# Patient Record
Sex: MALE | Race: WHITE | NOT HISPANIC OR LATINO | Employment: FULL TIME | URBAN - METROPOLITAN AREA
[De-identification: names, ages, dates, MRNs, and addresses within clinical notes are randomized per-mention and may not be internally consistent; named-entity substitution may affect disease eponyms.]

---

## 2023-08-25 DIAGNOSIS — I10 BENIGN ESSENTIAL HTN: Primary | ICD-10-CM

## 2023-08-28 PROBLEM — H40.003 GLAUCOMA SUSPECT OF BOTH EYES: Status: ACTIVE | Noted: 2023-08-28

## 2023-08-28 PROBLEM — M17.11 RIGHT KNEE DJD: Status: ACTIVE | Noted: 2023-08-28

## 2023-08-28 PROBLEM — L30.9 DERMATITIS, UNSPECIFIED: Status: ACTIVE | Noted: 2023-02-09

## 2023-08-28 PROBLEM — H52.209 ASTIGMATISM: Status: ACTIVE | Noted: 2023-08-28

## 2023-08-28 PROBLEM — I10 BENIGN ESSENTIAL HTN: Status: ACTIVE | Noted: 2023-08-28

## 2023-08-28 PROBLEM — N52.9 INABILITY TO ATTAIN ERECTION: Status: ACTIVE | Noted: 2023-08-28

## 2023-08-28 PROBLEM — E78.00 ELEVATED LDL CHOLESTEROL LEVEL: Status: ACTIVE | Noted: 2023-08-28

## 2023-08-28 PROBLEM — C43.72 MALIGNANT MELANOMA OF LEFT LOWER LEG (MULTI): Status: ACTIVE | Noted: 2023-08-28

## 2023-08-28 PROBLEM — M25.562 ACUTE PAIN OF LEFT KNEE: Status: ACTIVE | Noted: 2023-08-28

## 2023-08-28 RX ORDER — CLOBETASOL PROPIONATE 0.5 MG/G
CREAM TOPICAL
COMMUNITY
Start: 2022-04-29 | End: 2024-02-23 | Stop reason: HOSPADM

## 2023-08-28 RX ORDER — IRBESARTAN 150 MG/1
150 TABLET ORAL DAILY
Qty: 90 TABLET | Refills: 3 | Status: SHIPPED | OUTPATIENT
Start: 2023-08-28 | End: 2023-10-31 | Stop reason: ALTCHOICE

## 2023-08-28 RX ORDER — SILDENAFIL CITRATE 20 MG/1
20 TABLET ORAL
COMMUNITY
Start: 2017-06-13 | End: 2023-10-23 | Stop reason: ALTCHOICE

## 2023-08-28 RX ORDER — HYDROXYUREA 500 MG/1
500 CAPSULE ORAL
COMMUNITY
Start: 2022-04-22 | End: 2023-10-23 | Stop reason: ALTCHOICE

## 2023-08-28 RX ORDER — IRBESARTAN 150 MG/1
150 TABLET ORAL DAILY
COMMUNITY
Start: 2019-05-12 | End: 2023-11-27 | Stop reason: SDUPTHER

## 2023-08-28 RX ORDER — SIMVASTATIN 20 MG/1
20 TABLET, FILM COATED ORAL EVERY OTHER DAY
COMMUNITY
Start: 2016-05-06 | End: 2023-11-03 | Stop reason: SDUPTHER

## 2023-08-28 RX ORDER — CHOLECALCIFEROL (VITAMIN D3) 50 MCG
50 TABLET ORAL DAILY
COMMUNITY
Start: 2021-06-01 | End: 2024-02-29 | Stop reason: DRUGHIGH

## 2023-09-07 ENCOUNTER — HOSPITAL ENCOUNTER (OUTPATIENT)
Dept: DATA CONVERSION | Facility: HOSPITAL | Age: 63
End: 2023-09-07
Attending: OPHTHALMOLOGY | Admitting: OPHTHALMOLOGY
Payer: COMMERCIAL

## 2023-09-07 DIAGNOSIS — H25.811 COMBINED FORMS OF AGE-RELATED CATARACT, RIGHT EYE: ICD-10-CM

## 2023-09-29 VITALS
HEART RATE: 70 BPM | SYSTOLIC BLOOD PRESSURE: 156 MMHG | RESPIRATION RATE: 16 BRPM | DIASTOLIC BLOOD PRESSURE: 89 MMHG | TEMPERATURE: 98.1 F

## 2023-09-30 NOTE — H&P
History of Present Illness:   History Present Illness:  Reason for surgery: cataract right eye   HPI:    Patient w/hx of cataract right eye presenting for cataract extraction with intraocular lens implantation of the right eye. Feels well, no health changes since prior  physician exam.     Allergies:        Allergies:  ·  No Known Allergies :     Home Medication Review:   Home Medications Reviewed: yes     Impression/Procedure:   ·  Impression and Planned Procedure: hx of cataract right eye presenting for cataract extraction with intraocular lens implantation of the right eye       ERAS (Enhanced Recovery After Surgery):  ·  ERAS Patient: no       Vital Signs:  Temperature C: 36.7 degrees C   Temperature F: 98 degrees F   Heart Rate: 70 beats per minute   Respiratory Rate: 16 breath per minute   Blood Pressure Systolic: 156 mm/Hg   Blood Pressure Diastolic: 89 mm/Hg     Physical Exam by System:    Constitutional: Well developed, awake/alert, no distress,  alert and cooperative   Eyes: Clear sclera   ENMT: mucous membranes moist, no apparent injury,  further exam per anesthesia   Respiratory/Thorax: Unlabored respirations, symmetric  chest expansion without stridor, auscultation per anesthesia   Cardiovascular: Auscultation per anesthesia   Gastrointestinal: abdomen nondistended   Extremities: no cyanosis, contusions or wounds   Neurological: alert and oriented x3   Psychological: Appropriate mood and behavior   Skin: Warm and dry     Consent:   COVID-19 Consent:  ·  COVID-19 Risk Consent Surgeon has reviewed key risks related to the risk of rody COVID-19 and if they contract COVID-19 what the risks are.     Attestation:   Note Completion:  I am a:  Resident/Fellow   Attending Attestation I saw and evaluated the patient.  I personally obtained the key and critical portions of the history and physical exam or was physically present for key and  critical portions performed by the resident/fellow. I reviewed  the resident/fellow?s documentation and discussed the patient with the resident/fellow.  I agree with the resident/fellow?s medical decision making as documented in the note.     I personally evaluated the patient on 07-Sep-2023         Electronic Signatures:  Delfino Yip (Resident))  (Signed 07-Sep-2023 12:07)   Authored: History of Present Illness, Allergies, Home  Medication Review, Impression/Procedure, ERAS, Physical Exam, Consent, Note Completion  Marivel Clarke)  (Signed 07-Sep-2023 13:28)   Authored: Note Completion   Co-Signer: History of Present Illness, Allergies, Home Medication Review, Impression/Procedure, ERAS, Physical Exam, Consent, Note Completion      Last Updated: 07-Sep-2023 13:28 by Marivel Clarke)

## 2023-10-01 NOTE — OP NOTE
Post Operative Note:     PreOp Diagnosis: Combined cataract - Right eye   Post-Procedure Diagnosis: Combined cataract - Right  eye   Procedure: Phacoemulsification of cataract with intraocular  lens implant - RIGHT Eye   Surgeon: Marivel Clarke MD   Resident/Fellow/Other Assistant: Delfino Yip MD   Estimated Blood Loss (mL): none   Specimen: no   Complications: None   Findings: As above     Operative Report Dictated:  Dictation: not applicable - note contains Operative  Report   Note Recipients: Crescencio Bourgeois MD   Operative Report:    Patient name: Van Hayes  YOB: 1960  MRN: [id number]  Date of surgery: 09/07/2023  Preoperative diagnosis: Combined cataract of the RIGHT eye  Postoperative diagnosis: Combined cataract of the RIGHT eye  Procedure: Phacoemulsification of cataract with insertion of intraocular lens, RIGHT eye   Surgeon: Marivel Clarke MD  Resident: Delfino Yip MD  Anesthesia: MAC  Complications: None  Lens Inserted: Jose Ramon & Jose Ramon DIB00 with a power of +14.00 D, serial # [id number]. Exp: 04/22/2026.    Procedure description: After the risks, benefits, and alternatives of the planned procedure were discussed with the patient, informed consent was obtained at the preoperative evaluation. On the day of surgery, there were no updates to the consent form  and any patient questions were answered. The patient was correctly identified in the preoperative area and the RIGHT eye was marked as the operative eye. Dilating  drops were instilled into the RIGHT eye in the preoperative area. The patient was then  taken back to the operating room and placed under sedation. The patient was prepped and draped in the standard, sterile ophthalmic fashion in preparation for intraocular surgery.    A lid speculum was placed into the RIGHT palpebral fissure and the operating microscope was brought into position. A paracentesis was made in superotemporal cornea at the limbus with a  1.0mm side port blade using a cotton tipped applicator to provide  countertraction. Intracameral lidocaine was injected into the anterior chamber followed by Viscoat viscoelastic. Using 0.12 forceps to stabilize the globe, a 2.4mm keratome blade was used to create a limbal clear-corneal incision inferotemporally. A bent-needle  cystotome and Utrata forceps were used to create a continuous curvilinear capsulorrhexis. Balanced salt saline solution on a blunt-tipped cannula was used to achieve hydrodissection.    A phacoemulsification device and a [place] spatula were used to remove the nucleus using a divide-and-conquer technique. Residual cortical material was removed with the irrigation and aspiration handpiece. Provisc viscoelastic was then injected into  the eye to reform the anterior chamber and to open the capsular bag. The posterior capsule was inspected and found to be clean and intact. The intraocular lens, an  Jose Ramon & Jose Ramon DIB00 with a power of +14.00 D was injected into the capsular bag. A  lens positioner was used to center the lens and ensure good position within the bag. The remaining viscoelastic was removed using irrigation and aspiration. Balanced salt saline solution on a blunt-tipped cannula was then used to hydrate the corneal stroma  adjacent to the main wound and paracentesis site as well as to reform the anterior chamber. The wound was checked and found to be watertight with normal intraocular pressure verified using digital palpation. At the conclusion of the case, a well-centered  intraocular lens with a good red reflex was observed. Tetracaine, betadine, BSS, and prednisolone acetate drops were instilled into the RIGHT eye. The lid speculum and drapes were removed. A clear plastic shield was then taped over the eye. The patient  was taken to the recovery room in stable condition, having tolerated the procedure well. There were no complications.      Attestation:   Note  Completion:  Attending Attestation I performed the procedure without a resident   Comments/ Additional Findings    I performed the surgery with the resident as an observer        Electronic Signatures:  Marivel Clarke)  (Signed 07-Sep-2023 13:33)   Authored: Post Operative Note, Note Completion      Last Updated: 07-Sep-2023 13:33 by Marivel Clarke)

## 2023-10-18 ENCOUNTER — PREP FOR PROCEDURE (OUTPATIENT)
Dept: OPHTHALMOLOGY | Facility: CLINIC | Age: 63
End: 2023-10-18
Payer: COMMERCIAL

## 2023-10-18 DIAGNOSIS — H25.812 COMBINED FORMS OF AGE-RELATED CATARACT OF LEFT EYE: Primary | ICD-10-CM

## 2023-10-18 RX ORDER — CYCLOPENTOLATE HYDROCHLORIDE 10 MG/ML
1 SOLUTION/ DROPS OPHTHALMIC ONCE
Status: CANCELLED | OUTPATIENT
Start: 2023-10-18 | End: 2023-10-18

## 2023-10-18 RX ORDER — TETRACAINE HYDROCHLORIDE 5 MG/ML
1 SOLUTION OPHTHALMIC ONCE
Status: CANCELLED | OUTPATIENT
Start: 2023-10-18 | End: 2023-10-18

## 2023-10-29 NOTE — PROGRESS NOTES
Subjective   Patient ID: Van Hayes is a 62 y.o. male who presents for CPE    HPI   The patient reports improvement in visual acuity in the right eye since his surgery in early September.  He does however report that since the surgery he has noted more frequent floaters in the right field of vision.  He reports no other associated symptoms..  The patient is scheduled for cataract surgery on the left eye November 2    The patient also reports a history of increased fatigue recently.  He does report that his wife has commented on his snoring but he states that she has not noted any apneic episodes.    The patient reports continued chronic difficulty achieving and maintaining erections times years.  He reports no associated symptoms.  He reports no change in his ability to achieve and maintain erections with use of sildenafil 40 mg p.o. daily as needed    Patient reports a history of frequent episodes of soreness over the medial surface of the left knee times a few weeks beginning 2 months ago.  He reports that the episodes developed acutely while he was carrying for his son.  He reports that the episodes were precipitated by certain movements.  He reports no associated swelling or instability.  No other associated symptoms.    Over the past year, the patient reports continued chronic intermittent episodes of dull/aching pain in the right knee.  He reports that the episodes still can be precipitated by walking up stairs greater than downstairs.  He still reports associated instability in the knee sometimes.  He reports no other associated symptoms.  Over the past year, he reports a decrease in the frequency and intensity of the episodes.    Over the past year, the patient reports chronic intermittent episodes of generalized pruritus.  He reports that the only time that he experiences the pruritus is when the pressure of a sheet is applied to the skin.  He reports no associated rash.  No other associated symptoms.   Over the past year, he reports a decrease in the frequency and intensity of the episodes.    The patient reports continued chronic occasional episodes of soreness over the posterior surface of the neck times years.  He reports that the episodes usually are present in the morning when he wakes up and that the discomfort improves as the day goes on.  He reports no radiation of discomfort.  He reports no associated bilateral upper extremity weakness/paresthesias.  No other associated symptoms.  No other new complaints.  Review of Systems  All systems have been reviewed and are normal except as previously noted  Objective   There were no vitals taken for this visit.    Physical Exam  Head - palpation revealed no tenderness over the maxillary or frontal sinuses  Eyes - extraocular movements intact pupils equal and reactive to light; right fundus not well-visualized, left fundus revealed good retinal color, no hemorrhages or exudates  Ears - palpation of pinnas and traguses revealed no tenderness. External auditory canals not erythematous or swollen. TMs clear.   Nose - turbinates not erythematous or swollen, nasal septal deviation noted to the right  Mouth - posterior pharynx is erythematous but not swollen Tonsillar pillars appeared normal no exudates  Neck - no lymphadenopathy. Thyroid gland not enlarged. , No bruits  Lungs - clear to auscultation bilaterally  Cardiac - rate normal rhythm regular no murmurs no JVD  Abdomen - soft nondistended normal active bowel sounds. Palpation revealed no tenderness or masses  Rectal- stool guaiac negative. Hemorrhoids noted. Prostate gland mildly enlarged, no masses  Pulses 2+ bilaterally   Extremities-no peripheral edema  Musculoskeletal  Cervical spine-no erythema or swelling.  Full range of motion with mild discomfort noted on rotation bilaterally.  Full range of motion with no pain or discomfort noted in all other directions of motion.  Palpation revealed no tenderness or  increase in warmth  Lumbar spine-no erythema or swelling. Full range of motion with mild discomfort noted on rotation and bending to the left.  Full range of motion with no pain noted in all other directions of motion.. Full range of motion with no pain noted in all other directions of motion. Palpation revealed no tenderness or increase in warmth  Right knee -no erythema or swelling noted.. Full range of motion in all directions of motion with no pain. Palpation revealed crepitus but no tenderness or increase in warmth.  Negative Lachmans test, negative Willy test-;, negative patellar apprehension test. There is no pain or laxity of the collateral ligaments.  Skin-moderate scaling noted over the lateral, medial, and plantar surfaces of both feet. No erythema or eruptions noted. Palpation revealed no tenderness or increase in warmth.  Yellowish-greenish discoloration noted in all toenails.    Neurologic  Mental status-alert and oriented x3   Cranial nerves-2 through 12 grossly intact, no visual field abnormalities  Motor-no pronator drift noted, strength-5/5 in all muscle groups tested, , no tremor noted.  No bradykinesia noted.  No rigidity noted.  Negative pull test  Sensory-Light touch sensation fully intact  Pinprick sensation fully intact  Vibratory sensation diminished in the first toes bilaterally equally  Cerebellar-no truncal ataxia, good coordination finger-nose testing,, good coordination heel-to-shin testing, normal rapid alternating movements  Romberg negative, no abnormality in tandem gait  Reflexes-1+/4 bilaterally  Assessment/Plan           Assessment     Noncardiac chest pain  Hypertension -second blood pressure at goal  Chronic intermittent presence of floaters in both fields of vision-probably secondary to posterior vitreous detachment.  Cataract right eye status post removal September 7, 2023 ;cataract left eye  Allergic rhinitis  Gastroesophageal reflux  Colonic polyp  Chronic frequent  urination, chronic mildly weak stream-, urinary hesitancy, slow urine stream-? Secondary to BPH  Erectile dysfunction  Impaired fasting glucose  Hypertriglyceridemia  Vitamin B12 deficiency  Vitamin D deficiency-mild  Chronic intermittent episodes of dull/aching pain in right knee- maybe secondary to meniscus tear, and/or osteoarthritis  Osteoarthritis of the right knee  Chronic occasional episodes of soreness over the posterior surface of the neck-may be secondary to osteoarthritis and degenerative disc disease of the cervical spine at C5-C7  Mild osteoarthritis and degenerative disc disease of the cervical spine at C5-C7  Chronic intermittent episodes of aching tightness located just above the superior end of the midportion of the left buttock-may be secondary to lumbosacral strain, sacroiliac joint dysfunction, osteoarthritis and degenerative disc disease of the lumbosacral spine  Recent history of generalized fatigue-unsure of etiology.  May be secondary to obstructive sleep apnea?  Chronic intermittent episodes of generalized pruritus-unsure of etiology.  Melanoma left posterior lower leg status post excision September 24, 2021  Chronic presence of scaling noted over the lateral, medial and plantar surfaces of both feet-likely secondary tinea pedis  Onychomycosis of multiple toenails    Plan  Obtain CBC differential, CMP, fasting lipid profile, TSH, vitamin D level, vitamin B12 level, cardiac CRP is soon as possible  Obtain EKG and urinalysis today.  Increase sildenafil dosage to 60 mg p.o. daily as needed  I again told the patient to take Relafen 500 mg p.o. twice daily as needed.  If lab work unremarkable, I will consider obtaining a home sleep study.  The patient will continue all of his other current medications and supplements.  He will return for a complete physical examination in 1 year

## 2023-10-31 ENCOUNTER — OFFICE VISIT (OUTPATIENT)
Dept: PRIMARY CARE | Facility: CLINIC | Age: 63
End: 2023-10-31
Payer: COMMERCIAL

## 2023-10-31 VITALS
WEIGHT: 243.4 LBS | BODY MASS INDEX: 33.71 KG/M2 | SYSTOLIC BLOOD PRESSURE: 120 MMHG | HEART RATE: 82 BPM | DIASTOLIC BLOOD PRESSURE: 80 MMHG

## 2023-10-31 DIAGNOSIS — Z00.00 ROUTINE GENERAL MEDICAL EXAMINATION AT A HEALTH CARE FACILITY: ICD-10-CM

## 2023-10-31 DIAGNOSIS — E78.00 ELEVATED LDL CHOLESTEROL LEVEL: ICD-10-CM

## 2023-10-31 DIAGNOSIS — N52.9 INABILITY TO ATTAIN ERECTION: ICD-10-CM

## 2023-10-31 DIAGNOSIS — I10 BENIGN ESSENTIAL HTN: Primary | ICD-10-CM

## 2023-10-31 DIAGNOSIS — R73.01 IFG (IMPAIRED FASTING GLUCOSE): ICD-10-CM

## 2023-10-31 DIAGNOSIS — C43.72 MALIGNANT MELANOMA OF LEFT LOWER LEG (MULTI): ICD-10-CM

## 2023-10-31 DIAGNOSIS — M17.11 PRIMARY OSTEOARTHRITIS OF RIGHT KNEE: ICD-10-CM

## 2023-10-31 LAB
POC APPEARANCE, URINE: CLEAR
POC BILIRUBIN, URINE: NEGATIVE
POC BLOOD, URINE: NEGATIVE
POC COLOR, URINE: YELLOW
POC GLUCOSE, URINE: NEGATIVE MG/DL
POC KETONES, URINE: NEGATIVE MG/DL
POC LEUKOCYTES, URINE: NEGATIVE
POC NITRITE,URINE: NEGATIVE
POC PH, URINE: 5 PH
POC PROTEIN, URINE: NEGATIVE MG/DL
POC SPECIFIC GRAVITY, URINE: 1.02
POC UROBILINOGEN, URINE: 0.2 EU/DL

## 2023-10-31 PROCEDURE — 81002 URINALYSIS NONAUTO W/O SCOPE: CPT | Performed by: INTERNAL MEDICINE

## 2023-10-31 PROCEDURE — G0439 PPPS, SUBSEQ VISIT: HCPCS | Performed by: INTERNAL MEDICINE

## 2023-10-31 PROCEDURE — 3074F SYST BP LT 130 MM HG: CPT | Performed by: INTERNAL MEDICINE

## 2023-10-31 PROCEDURE — 93000 ELECTROCARDIOGRAM COMPLETE: CPT | Performed by: INTERNAL MEDICINE

## 2023-10-31 PROCEDURE — 3079F DIAST BP 80-89 MM HG: CPT | Performed by: INTERNAL MEDICINE

## 2023-10-31 PROCEDURE — 1036F TOBACCO NON-USER: CPT | Performed by: INTERNAL MEDICINE

## 2023-10-31 RX ORDER — NABUMETONE 500 MG/1
500 TABLET, FILM COATED ORAL 2 TIMES DAILY
COMMUNITY
End: 2023-10-31 | Stop reason: SDUPTHER

## 2023-10-31 RX ORDER — LANOLIN ALCOHOL/MO/W.PET/CERES
1000 CREAM (GRAM) TOPICAL DAILY
COMMUNITY

## 2023-10-31 RX ORDER — NABUMETONE 500 MG/1
500 TABLET, FILM COATED ORAL 2 TIMES DAILY
Qty: 60 TABLET | Refills: 2 | Status: SHIPPED | OUTPATIENT
Start: 2023-10-31 | End: 2024-02-23 | Stop reason: HOSPADM

## 2023-11-01 ENCOUNTER — LAB (OUTPATIENT)
Dept: LAB | Facility: LAB | Age: 63
End: 2023-11-01
Payer: COMMERCIAL

## 2023-11-01 ENCOUNTER — ANESTHESIA EVENT (OUTPATIENT)
Dept: OPERATING ROOM | Facility: CLINIC | Age: 63
End: 2023-11-01
Payer: COMMERCIAL

## 2023-11-01 DIAGNOSIS — E78.00 ELEVATED LDL CHOLESTEROL LEVEL: ICD-10-CM

## 2023-11-01 DIAGNOSIS — R73.01 IFG (IMPAIRED FASTING GLUCOSE): ICD-10-CM

## 2023-11-01 DIAGNOSIS — I10 BENIGN ESSENTIAL HTN: ICD-10-CM

## 2023-11-01 DIAGNOSIS — C43.72 MALIGNANT MELANOMA OF LEFT LOWER LEG (MULTI): ICD-10-CM

## 2023-11-01 DIAGNOSIS — N52.9 INABILITY TO ATTAIN ERECTION: ICD-10-CM

## 2023-11-01 LAB
25(OH)D3 SERPL-MCNC: 54 NG/ML (ref 30–100)
ALBUMIN SERPL BCP-MCNC: 4.6 G/DL (ref 3.4–5)
ALP SERPL-CCNC: 77 U/L (ref 33–136)
ALT SERPL W P-5'-P-CCNC: 26 U/L (ref 10–52)
ANION GAP SERPL CALC-SCNC: 10 MMOL/L (ref 10–20)
AST SERPL W P-5'-P-CCNC: 23 U/L (ref 9–39)
BASOPHILS # BLD AUTO: 0.07 X10*3/UL (ref 0–0.1)
BASOPHILS NFR BLD AUTO: 1.2 %
BILIRUB SERPL-MCNC: 0.8 MG/DL (ref 0–1.2)
BUN SERPL-MCNC: 14 MG/DL (ref 6–23)
CALCIUM SERPL-MCNC: 9.4 MG/DL (ref 8.6–10.3)
CHLORIDE SERPL-SCNC: 103 MMOL/L (ref 98–107)
CHOLEST SERPL-MCNC: 193 MG/DL (ref 0–199)
CHOLESTEROL/HDL RATIO: 4.2
CO2 SERPL-SCNC: 29 MMOL/L (ref 21–32)
CREAT SERPL-MCNC: 0.94 MG/DL (ref 0.5–1.3)
EOSINOPHIL # BLD AUTO: 0.3 X10*3/UL (ref 0–0.7)
EOSINOPHIL NFR BLD AUTO: 5.2 %
ERYTHROCYTE [DISTWIDTH] IN BLOOD BY AUTOMATED COUNT: 12.7 % (ref 11.5–14.5)
GFR SERPL CREATININE-BSD FRML MDRD: >90 ML/MIN/1.73M*2
GLUCOSE SERPL-MCNC: 93 MG/DL (ref 74–99)
HCT VFR BLD AUTO: 48.3 % (ref 41–52)
HDLC SERPL-MCNC: 45.7 MG/DL
HGB BLD-MCNC: 16.3 G/DL (ref 13.5–17.5)
IMM GRANULOCYTES # BLD AUTO: 0.05 X10*3/UL (ref 0–0.7)
IMM GRANULOCYTES NFR BLD AUTO: 0.9 % (ref 0–0.9)
LDLC SERPL CALC-MCNC: 119 MG/DL
LYMPHOCYTES # BLD AUTO: 1.23 X10*3/UL (ref 1.2–4.8)
LYMPHOCYTES NFR BLD AUTO: 21.2 %
MCH RBC QN AUTO: 30.6 PG (ref 26–34)
MCHC RBC AUTO-ENTMCNC: 33.7 G/DL (ref 32–36)
MCV RBC AUTO: 91 FL (ref 80–100)
MONOCYTES # BLD AUTO: 0.42 X10*3/UL (ref 0.1–1)
MONOCYTES NFR BLD AUTO: 7.3 %
NEUTROPHILS # BLD AUTO: 3.72 X10*3/UL (ref 1.2–7.7)
NEUTROPHILS NFR BLD AUTO: 64.2 %
NON HDL CHOLESTEROL: 147 MG/DL (ref 0–149)
NRBC BLD-RTO: 0.5 /100 WBCS (ref 0–0)
PLATELET # BLD AUTO: 177 X10*3/UL (ref 150–450)
POTASSIUM SERPL-SCNC: 4.5 MMOL/L (ref 3.5–5.3)
PROT SERPL-MCNC: 7.2 G/DL (ref 6.4–8.2)
PSA SERPL-MCNC: 1.2 NG/ML
RBC # BLD AUTO: 5.32 X10*6/UL (ref 4.5–5.9)
SODIUM SERPL-SCNC: 137 MMOL/L (ref 136–145)
TRIGL SERPL-MCNC: 143 MG/DL (ref 0–149)
TSH SERPL-ACNC: 1.33 MIU/L (ref 0.44–3.98)
VIT B12 SERPL-MCNC: 664 PG/ML (ref 211–911)
VLDL: 29 MG/DL (ref 0–40)
WBC # BLD AUTO: 5.8 X10*3/UL (ref 4.4–11.3)

## 2023-11-01 PROCEDURE — 80053 COMPREHEN METABOLIC PANEL: CPT

## 2023-11-01 PROCEDURE — 84402 ASSAY OF FREE TESTOSTERONE: CPT

## 2023-11-01 PROCEDURE — 84443 ASSAY THYROID STIM HORMONE: CPT

## 2023-11-01 PROCEDURE — 82306 VITAMIN D 25 HYDROXY: CPT

## 2023-11-01 PROCEDURE — 80061 LIPID PANEL: CPT

## 2023-11-01 PROCEDURE — 86141 C-REACTIVE PROTEIN HS: CPT

## 2023-11-01 PROCEDURE — 82607 VITAMIN B-12: CPT

## 2023-11-01 PROCEDURE — 85025 COMPLETE CBC W/AUTO DIFF WBC: CPT

## 2023-11-01 PROCEDURE — 36415 COLL VENOUS BLD VENIPUNCTURE: CPT

## 2023-11-01 PROCEDURE — 83036 HEMOGLOBIN GLYCOSYLATED A1C: CPT

## 2023-11-01 PROCEDURE — 84153 ASSAY OF PSA TOTAL: CPT

## 2023-11-02 ENCOUNTER — HOSPITAL ENCOUNTER (OUTPATIENT)
Facility: CLINIC | Age: 63
Setting detail: OUTPATIENT SURGERY
Discharge: HOME | End: 2023-11-02
Attending: OPHTHALMOLOGY | Admitting: OPHTHALMOLOGY
Payer: COMMERCIAL

## 2023-11-02 ENCOUNTER — ANESTHESIA (OUTPATIENT)
Dept: OPERATING ROOM | Facility: CLINIC | Age: 63
End: 2023-11-02
Payer: COMMERCIAL

## 2023-11-02 VITALS
TEMPERATURE: 98.1 F | DIASTOLIC BLOOD PRESSURE: 67 MMHG | HEIGHT: 72 IN | WEIGHT: 240.3 LBS | BODY MASS INDEX: 32.55 KG/M2 | RESPIRATION RATE: 16 BRPM | HEART RATE: 97 BPM | SYSTOLIC BLOOD PRESSURE: 130 MMHG | OXYGEN SATURATION: 97 %

## 2023-11-02 DIAGNOSIS — H25.812 COMBINED FORMS OF AGE-RELATED CATARACT OF LEFT EYE: Primary | ICD-10-CM

## 2023-11-02 DIAGNOSIS — H25.812 COMBINED FORM OF AGE-RELATED CATARACT, LEFT EYE: Primary | ICD-10-CM

## 2023-11-02 PROBLEM — R21 RASH AND OTHER NONSPECIFIC SKIN ERUPTION: Status: ACTIVE | Noted: 2023-02-09

## 2023-11-02 PROBLEM — Z86.010 PERSONAL HISTORY OF COLONIC POLYPS: Status: ACTIVE | Noted: 2018-09-06

## 2023-11-02 PROBLEM — Z86.0100 PERSONAL HISTORY OF COLONIC POLYPS: Status: ACTIVE | Noted: 2018-09-06

## 2023-11-02 PROBLEM — H25.811 COMBINED FORM OF AGE-RELATED CATARACT, RIGHT EYE: Status: ACTIVE | Noted: 2023-11-02

## 2023-11-02 PROBLEM — B35.3 TINEA PEDIS: Status: ACTIVE | Noted: 2023-02-09

## 2023-11-02 PROBLEM — L81.4 OTHER MELANIN HYPERPIGMENTATION: Status: ACTIVE | Noted: 2023-02-09

## 2023-11-02 PROBLEM — H52.4 MYOPIA WITH PRESBYOPIA: Status: ACTIVE | Noted: 2023-11-02

## 2023-11-02 PROBLEM — L27.0 GENERALIZED SKIN ERUPTION DUE TO DRUGS AND MEDICAMENTS TAKEN INTERNALLY: Status: ACTIVE | Noted: 2023-02-09

## 2023-11-02 PROBLEM — H35.373 EPIRETINAL MEMBRANE (ERM) OF BOTH EYES: Status: ACTIVE | Noted: 2023-11-02

## 2023-11-02 PROBLEM — M25.461 KNEE EFFUSION, RIGHT: Status: ACTIVE | Noted: 2023-11-02

## 2023-11-02 PROBLEM — H02.409 PTOSIS: Status: ACTIVE | Noted: 2023-11-02

## 2023-11-02 PROBLEM — E53.8 VITAMIN B12 DEFICIENCY: Status: ACTIVE | Noted: 2023-11-02

## 2023-11-02 PROBLEM — Z96.1 PSEUDOPHAKIA OF RIGHT EYE: Status: ACTIVE | Noted: 2023-11-02

## 2023-11-02 PROBLEM — H43.813 PVD (POSTERIOR VITREOUS DETACHMENT), BOTH EYES: Status: ACTIVE | Noted: 2023-11-02

## 2023-11-02 PROBLEM — H52.10 MYOPIA WITH PRESBYOPIA: Status: ACTIVE | Noted: 2023-11-02

## 2023-11-02 PROBLEM — R06.2 WHEEZING: Status: ACTIVE | Noted: 2023-11-02

## 2023-11-02 PROBLEM — D22.72 NEVUS OF LEFT THIGH: Status: ACTIVE | Noted: 2023-11-02

## 2023-11-02 PROBLEM — D22.5 MELANOCYTIC NEVI OF TRUNK: Status: ACTIVE | Noted: 2023-02-09

## 2023-11-02 PROBLEM — D22.72 MELANOCYTIC NEVI OF LEFT LOWER LIMB, INCLUDING HIP: Status: ACTIVE | Noted: 2023-02-09

## 2023-11-02 LAB
CRP SERPL HS-MCNC: 0.5 MG/L
EST. AVERAGE GLUCOSE BLD GHB EST-MCNC: 117 MG/DL
HBA1C MFR BLD: 5.7 %

## 2023-11-02 PROCEDURE — 3600000008 HC OR TIME - EACH INCREMENTAL 1 MINUTE - PROCEDURE LEVEL THREE: Performed by: OPHTHALMOLOGY

## 2023-11-02 PROCEDURE — 7100000010 HC PHASE TWO TIME - EACH INCREMENTAL 1 MINUTE: Performed by: OPHTHALMOLOGY

## 2023-11-02 PROCEDURE — A66984 PR REMV CATARACT EXTRACAP,INSERT LENS: Performed by: ANESTHESIOLOGIST ASSISTANT

## 2023-11-02 PROCEDURE — 2500000005 HC RX 250 GENERAL PHARMACY W/O HCPCS: Performed by: OPHTHALMOLOGY

## 2023-11-02 PROCEDURE — 2500000001 HC RX 250 WO HCPCS SELF ADMINISTERED DRUGS (ALT 637 FOR MEDICARE OP): Performed by: OPHTHALMOLOGY

## 2023-11-02 PROCEDURE — 2500000004 HC RX 250 GENERAL PHARMACY W/ HCPCS (ALT 636 FOR OP/ED): Performed by: ANESTHESIOLOGIST ASSISTANT

## 2023-11-02 PROCEDURE — 2500000001 HC RX 250 WO HCPCS SELF ADMINISTERED DRUGS (ALT 637 FOR MEDICARE OP): Performed by: STUDENT IN AN ORGANIZED HEALTH CARE EDUCATION/TRAINING PROGRAM

## 2023-11-02 PROCEDURE — A4217 STERILE WATER/SALINE, 500 ML: HCPCS | Performed by: OPHTHALMOLOGY

## 2023-11-02 PROCEDURE — 2500000004 HC RX 250 GENERAL PHARMACY W/ HCPCS (ALT 636 FOR OP/ED): Performed by: OPHTHALMOLOGY

## 2023-11-02 PROCEDURE — A66984 PR REMV CATARACT EXTRACAP,INSERT LENS: Performed by: ANESTHESIOLOGY

## 2023-11-02 PROCEDURE — 66984 XCAPSL CTRC RMVL W/O ECP: CPT | Performed by: OPHTHALMOLOGY

## 2023-11-02 PROCEDURE — 2760000001 HC OR 276 NO HCPCS: Performed by: OPHTHALMOLOGY

## 2023-11-02 PROCEDURE — 3600000003 HC OR TIME - INITIAL BASE CHARGE - PROCEDURE LEVEL THREE: Performed by: OPHTHALMOLOGY

## 2023-11-02 PROCEDURE — 7100000009 HC PHASE TWO TIME - INITIAL BASE CHARGE: Performed by: OPHTHALMOLOGY

## 2023-11-02 PROCEDURE — 3700000002 HC GENERAL ANESTHESIA TIME - EACH INCREMENTAL 1 MINUTE: Performed by: OPHTHALMOLOGY

## 2023-11-02 PROCEDURE — 3700000001 HC GENERAL ANESTHESIA TIME - INITIAL BASE CHARGE: Performed by: OPHTHALMOLOGY

## 2023-11-02 DEVICE — IMPLANTABLE DEVICE: Type: IMPLANTABLE DEVICE | Site: EYE | Status: FUNCTIONAL

## 2023-11-02 RX ORDER — LABETALOL HYDROCHLORIDE 5 MG/ML
5 INJECTION, SOLUTION INTRAVENOUS ONCE AS NEEDED
Status: DISCONTINUED | OUTPATIENT
Start: 2023-11-02 | End: 2023-11-02 | Stop reason: HOSPADM

## 2023-11-02 RX ORDER — PHENYLEPHRINE HYDROCHLORIDE 100 MG/ML
1 SOLUTION/ DROPS OPHTHALMIC
Status: COMPLETED | OUTPATIENT
Start: 2023-11-02 | End: 2023-11-02

## 2023-11-02 RX ORDER — KETOROLAC TROMETHAMINE 5 MG/ML
1 SOLUTION OPHTHALMIC 4 TIMES DAILY
COMMUNITY
End: 2024-02-23 | Stop reason: HOSPADM

## 2023-11-02 RX ORDER — PREDNISOLONE ACETATE 10 MG/ML
1 SUSPENSION/ DROPS OPHTHALMIC 4 TIMES DAILY
Qty: 5 ML | Refills: 2 | Status: SHIPPED | OUTPATIENT
Start: 2023-11-02 | End: 2024-02-23 | Stop reason: HOSPADM

## 2023-11-02 RX ORDER — TROPICAMIDE 10 MG/ML
1 SOLUTION/ DROPS OPHTHALMIC
Status: COMPLETED | OUTPATIENT
Start: 2023-11-02 | End: 2023-11-02

## 2023-11-02 RX ORDER — KETOCONAZOLE 20 MG/G
1 CREAM TOPICAL 2 TIMES DAILY
COMMUNITY
End: 2024-02-23 | Stop reason: HOSPADM

## 2023-11-02 RX ORDER — FENTANYL CITRATE 50 UG/ML
INJECTION, SOLUTION INTRAMUSCULAR; INTRAVENOUS AS NEEDED
Status: DISCONTINUED | OUTPATIENT
Start: 2023-11-02 | End: 2023-11-02

## 2023-11-02 RX ORDER — LIDOCAINE HYDROCHLORIDE 10 MG/ML
0.1 INJECTION, SOLUTION EPIDURAL; INFILTRATION; INTRACAUDAL; PERINEURAL ONCE
Status: DISCONTINUED | OUTPATIENT
Start: 2023-11-02 | End: 2023-11-02 | Stop reason: HOSPADM

## 2023-11-02 RX ORDER — LIDOCAINE HYDROCHLORIDE 10 MG/ML
INJECTION, SOLUTION EPIDURAL; INFILTRATION; INTRACAUDAL; PERINEURAL AS NEEDED
Status: DISCONTINUED | OUTPATIENT
Start: 2023-11-02 | End: 2023-11-02 | Stop reason: HOSPADM

## 2023-11-02 RX ORDER — SILDENAFIL CITRATE 20 MG/1
5 TABLET ORAL DAILY PRN
COMMUNITY
End: 2024-02-23 | Stop reason: HOSPADM

## 2023-11-02 RX ORDER — ACETAMINOPHEN 325 MG/1
650 TABLET ORAL EVERY 4 HOURS PRN
Status: DISCONTINUED | OUTPATIENT
Start: 2023-11-02 | End: 2023-11-02 | Stop reason: HOSPADM

## 2023-11-02 RX ORDER — OFLOXACIN 3 MG/ML
1 SOLUTION/ DROPS OPHTHALMIC 4 TIMES DAILY
COMMUNITY
End: 2024-02-23 | Stop reason: HOSPADM

## 2023-11-02 RX ORDER — ONDANSETRON HYDROCHLORIDE 2 MG/ML
4 INJECTION, SOLUTION INTRAVENOUS ONCE AS NEEDED
Status: DISCONTINUED | OUTPATIENT
Start: 2023-11-02 | End: 2023-11-02 | Stop reason: HOSPADM

## 2023-11-02 RX ORDER — METOCLOPRAMIDE HYDROCHLORIDE 5 MG/ML
10 INJECTION INTRAMUSCULAR; INTRAVENOUS ONCE AS NEEDED
Status: DISCONTINUED | OUTPATIENT
Start: 2023-11-02 | End: 2023-11-02 | Stop reason: HOSPADM

## 2023-11-02 RX ORDER — MIDAZOLAM HYDROCHLORIDE 1 MG/ML
INJECTION, SOLUTION INTRAMUSCULAR; INTRAVENOUS AS NEEDED
Status: DISCONTINUED | OUTPATIENT
Start: 2023-11-02 | End: 2023-11-02

## 2023-11-02 RX ORDER — SODIUM CHLORIDE 0.9 % (FLUSH) 0.9 %
SYRINGE (ML) INJECTION AS NEEDED
Status: DISCONTINUED | OUTPATIENT
Start: 2023-11-02 | End: 2023-11-02

## 2023-11-02 RX ORDER — WATER 1 ML/ML
IRRIGANT IRRIGATION AS NEEDED
Status: DISCONTINUED | OUTPATIENT
Start: 2023-11-02 | End: 2023-11-02 | Stop reason: HOSPADM

## 2023-11-02 RX ORDER — ALBUTEROL SULFATE 0.83 MG/ML
2.5 SOLUTION RESPIRATORY (INHALATION) ONCE AS NEEDED
Status: DISCONTINUED | OUTPATIENT
Start: 2023-11-02 | End: 2023-11-02 | Stop reason: HOSPADM

## 2023-11-02 RX ORDER — GLUCOSAMINE/CHONDR SU A SOD 167-133 MG
500 CAPSULE ORAL EVERY OTHER DAY
COMMUNITY
End: 2023-11-03 | Stop reason: ALTCHOICE

## 2023-11-02 RX ORDER — FENTANYL CITRATE 50 UG/ML
50 INJECTION, SOLUTION INTRAMUSCULAR; INTRAVENOUS EVERY 5 MIN PRN
Status: DISCONTINUED | OUTPATIENT
Start: 2023-11-02 | End: 2023-11-02 | Stop reason: HOSPADM

## 2023-11-02 RX ORDER — BETAMETHASONE DIPROPIONATE 0.5 MG/G
1 CREAM TOPICAL
COMMUNITY
End: 2024-02-23 | Stop reason: HOSPADM

## 2023-11-02 RX ORDER — TETRACAINE HYDROCHLORIDE 5 MG/ML
1 SOLUTION OPHTHALMIC ONCE
Status: COMPLETED | OUTPATIENT
Start: 2023-11-02 | End: 2023-11-02

## 2023-11-02 RX ORDER — SODIUM CHLORIDE, SODIUM LACTATE, POTASSIUM CHLORIDE, CALCIUM CHLORIDE 600; 310; 30; 20 MG/100ML; MG/100ML; MG/100ML; MG/100ML
100 INJECTION, SOLUTION INTRAVENOUS CONTINUOUS
Status: DISCONTINUED | OUTPATIENT
Start: 2023-11-02 | End: 2023-11-02 | Stop reason: HOSPADM

## 2023-11-02 RX ORDER — PREDNISOLONE ACETATE 10 MG/ML
1 SUSPENSION/ DROPS OPHTHALMIC 4 TIMES DAILY
Status: ON HOLD | COMMUNITY
Start: 2023-08-23 | End: 2023-11-02 | Stop reason: SDUPTHER

## 2023-11-02 RX ORDER — FENTANYL CITRATE 50 UG/ML
25 INJECTION, SOLUTION INTRAMUSCULAR; INTRAVENOUS EVERY 5 MIN PRN
Status: DISCONTINUED | OUTPATIENT
Start: 2023-11-02 | End: 2023-11-02 | Stop reason: HOSPADM

## 2023-11-02 RX ORDER — TETRACAINE HYDROCHLORIDE 5 MG/ML
SOLUTION OPHTHALMIC AS NEEDED
Status: DISCONTINUED | OUTPATIENT
Start: 2023-11-02 | End: 2023-11-02 | Stop reason: HOSPADM

## 2023-11-02 RX ORDER — POVIDONE-IODINE 5 %
SOLUTION, NON-ORAL OPHTHALMIC (EYE) AS NEEDED
Status: DISCONTINUED | OUTPATIENT
Start: 2023-11-02 | End: 2023-11-02 | Stop reason: HOSPADM

## 2023-11-02 RX ORDER — CYCLOPENTOLATE HYDROCHLORIDE 10 MG/ML
1 SOLUTION/ DROPS OPHTHALMIC ONCE
Status: COMPLETED | OUTPATIENT
Start: 2023-11-02 | End: 2023-11-02

## 2023-11-02 RX ADMIN — TROPICAMIDE 1 DROP: 10 SOLUTION/ DROPS OPHTHALMIC at 12:55

## 2023-11-02 RX ADMIN — CYCLOPENTOLATE HYDROCHLORIDE 1 DROP: 10 SOLUTION/ DROPS OPHTHALMIC at 12:45

## 2023-11-02 RX ADMIN — MIDAZOLAM 2 MG: 1 INJECTION INTRAMUSCULAR; INTRAVENOUS at 13:44

## 2023-11-02 RX ADMIN — TETRACAINE HYDROCHLORIDE 1 DROP: 5 SOLUTION/ DROPS OPHTHALMIC at 12:45

## 2023-11-02 RX ADMIN — PHENYLEPHRINE HYDROCHLORIDE 1 DROP: 100 SOLUTION/ DROPS OPHTHALMIC at 12:50

## 2023-11-02 RX ADMIN — CYCLOPENTOLATE HYDROCHLORIDE 1 DROP: 10 SOLUTION/ DROPS OPHTHALMIC at 12:55

## 2023-11-02 RX ADMIN — TROPICAMIDE 1 DROP: 10 SOLUTION/ DROPS OPHTHALMIC at 12:45

## 2023-11-02 RX ADMIN — TROPICAMIDE 1 DROP: 10 SOLUTION/ DROPS OPHTHALMIC at 12:50

## 2023-11-02 RX ADMIN — PHENYLEPHRINE HYDROCHLORIDE 1 DROP: 100 SOLUTION/ DROPS OPHTHALMIC at 12:55

## 2023-11-02 RX ADMIN — PHENYLEPHRINE HYDROCHLORIDE 1 DROP: 100 SOLUTION/ DROPS OPHTHALMIC at 12:45

## 2023-11-02 RX ADMIN — Medication 5 ML: at 13:45

## 2023-11-02 RX ADMIN — CYCLOPENTOLATE HYDROCHLORIDE 1 DROP: 10 SOLUTION/ DROPS OPHTHALMIC at 12:50

## 2023-11-02 RX ADMIN — FENTANYL CITRATE 50 MCG: 50 INJECTION, SOLUTION INTRAMUSCULAR; INTRAVENOUS at 13:44

## 2023-11-02 ASSESSMENT — PAIN SCALES - GENERAL
PAINLEVEL_OUTOF10: 0 - NO PAIN

## 2023-11-02 ASSESSMENT — PAIN - FUNCTIONAL ASSESSMENT
PAIN_FUNCTIONAL_ASSESSMENT: 0-10

## 2023-11-02 ASSESSMENT — COLUMBIA-SUICIDE SEVERITY RATING SCALE - C-SSRS
1. IN THE PAST MONTH, HAVE YOU WISHED YOU WERE DEAD OR WISHED YOU COULD GO TO SLEEP AND NOT WAKE UP?: NO
6. HAVE YOU EVER DONE ANYTHING, STARTED TO DO ANYTHING, OR PREPARED TO DO ANYTHING TO END YOUR LIFE?: NO
2. HAVE YOU ACTUALLY HAD ANY THOUGHTS OF KILLING YOURSELF?: NO

## 2023-11-02 NOTE — OP NOTE
Phacoemulsification Cataract with Insertion Intraocular Lens (L) Operative Note     Date: 2023  [place] [facility] [place] [facility] [place]    Name: Van Hayes YOB: 1960, Age: 62 y.o., MRN: [id number], Sex: male    Diagnosis  Pre-op Diagnosis     * Combined forms of age-related cataract of left eye [H25.812] Post-op Diagnosis     * Combined forms of age-related cataract of left eye [H25.812]     Procedures  Phacoemulsification Cataract with Insertion Intraocular Lens  07256 - [place] XCAPSL CTRC RMVL INSJ IO LENS PROSTH W/O ECP      Surgeons      * Marivel Clarke - Primary    Resident/Fellow/Other Assistant:  Surgeon(s) and Role:    Procedure Summary  Anesthesia: Monitor Anesthesia Care  ASA: II  Anesthesia Staff: Anesthesiologist: David Cuba DO  C-AA: ADONIS Remy  Estimated Blood Loss: 0 mL  Intra-op Medications:   Medication Name Total Dose   lidocaine PF (Xylocaine) 10 mg/mL (1 %) injection 1 mL   balanced salts (BSS) intraocular solution 150 mL   sterile water irrigation solution 20 mL   povidone-iodine 5 % ophthalmic solution 1 Application   tetracaine (PF) 0.5 % ophthalmic solution 2 drop              Anesthesia Record               Intraprocedure I/O Totals       None           Specimen: No specimens collected     Staff:   Circulator: Dominga Santana RN  Scrub Person: Katt Moreno         Drains and/or Catheters: * None in log *    Tourniquet Times:         Implants:  Implants       Type Name Action Serial No.      Ophthalmology Implant EYHANCE IOL W/ TECNIS SIMPLICITY DELIVERY SYSTEM Implanted [id number]              Findings: as below    Indications: Van Hayes is an 62 y.o. male who is having surgery for Combined forms of age-related cataract of left eye [H25.812].       Procedure Details: Patient name: Van Hayes   YOB: 1960   MRN: [id number]   Date of surgery: 2023  Preoperative diagnosis: Combined cataract of the LEFT eye  Postoperative  diagnosis: Combined cataract of the LEFT eye  Procedure: Phacoemulsification of cataract with insertion of intraocular lens, LEFT eye   Surgeon: Marivel Clarke MD  Resident:  Anesthesia: MAC  Complications: None  Lens Inserted: Jose Ramon & Jose Ramon DIB00 with a power of +13.50 D. Serial #: [id number]. Exp date: 10/13/2025.    Procedure description: After the risks, benefits, and alternatives of the planned procedure were discussed with the patient, informed consent was obtained at the preoperative evaluation. On the day of surgery, there were no updates to the consent form and any patient questions were answered. The patient was correctly identified in the preoperative area and the LEFT eye was marked as the operative eye. Dilating drops were instilled into the LEFT eye in the preoperative area. The patient was then taken back to the operating room and placed under sedation. The patient was prepped and draped in the standard, sterile ophthalmic fashion in preparation for intraocular surgery.    A lid speculum was placed into the LEFT palpebral fissure and the operating microscope was brought into position. A paracentesis was made in inferotemporal cornea at the limbus with a 1.0mm side port blade using a cotton tipped applicator to provide countertraction. Intracameral lidocaine was injected into the anterior chamber followed by Viscoat viscoelastic. Using 0.12 forceps to stabilize the globe, a 2.4mm keratome blade was used to create a limbal clear-corneal incision superotemporally. A bent-needle cystotome and Utrata forceps were used to create a continuous curvilinear capsulorrhexis. Balanced salt saline solution on a blunt-tipped cannula was used to achieve hydrodissection.    A phacoemulsification device and a [place] spatula were used to remove the nucleus using a divide-and-conquer technique. Residual cortical material was removed with the irrigation and aspiration handpiece. Provisc viscoelastic was then  injected into the eye to reform the anterior chamber and to open the capsular bag. The posterior capsule was inspected and found to be clean and intact. The intraocular lens, Jose Ramon & Jose Ramon DIB00 with a power of +13.50 diopters was injected into the capsular bag. A lens positioner was used to center the lens and ensure good position within the bag. The remaining viscoelastic was removed using irrigation and aspiration. Balanced salt saline solution on a blunt-tipped cannula was then used to hydrate the corneal stroma adjacent to the main wound and paracentesis site as well as to reform the anterior chamber. The wound was checked and found to be watertight with normal intraocular pressure verified using digital palpation. At the conclusion of the case, a well-centered intraocular lens with a good red reflex was observed. Tetracaine, betadine, BSS, and prednisolone acetate drops were instilled into the LEFT eye. The lid speculum and drapes were removed. A clear plastic shield was then taped over the eye. The patient was taken to the recovery room in stable condition, having tolerated the procedure well. There were no complications.      Complications:  None; patient tolerated the procedure well.    Disposition: PACU - hemodynamically stable.  Condition: stable         Additional Details: none    Attending Attestation: I performed the procedure.    Marivel Clarke  Phone Number: [phone]

## 2023-11-02 NOTE — DISCHARGE INSTRUCTIONS
Surgeon: Marivel Clarke MD     Patient name: Van Hayes    Date of surgery: November 2, 2023        DROP INSTRUCTIONS:    Prednisolone acetate 1% (pink or white cap) - One drop 4 times a day to operative eye    Ofloxacin (tan cap) - One drop 4 times a day to operative eye    Ketorolac (gray cap) - One drop 4 times a day to operative eye    When putting different drops in around the same administration time, please wait 1-2 minutes between drops  Avoid sleeping on side of operative eye  No heavy lifting over 10-15lbs and no bending over  Do not get eye wet, no eye rubbing  Wear sunglasses or glasses during the day and the shield when sleeping at night  Please call immediately if you develop any redness, pain, decreased vision, flashes, or floaters      During office hours (8:30a-4:30p): [phone]  After office hours: [phone])  Hospital : [phone]   Pager: [phone] for Dr. Adams

## 2023-11-02 NOTE — ANESTHESIA POSTPROCEDURE EVALUATION
Patient: Van Hayes    Procedure Summary       Date: 11/02/23 Room / Location: [facility] [place] [facility] 04 / Virtual [facility] [place] [facility]    Anesthesia Start: 1339 Anesthesia Stop: 1412    Procedure: Phacoemulsification Cataract with Insertion Intraocular Lens (Left: Eye) Diagnosis:       Combined forms of age-related cataract of left eye      (Combined forms of age-related cataract of left eye [H25.812])    Surgeons: Marivel Clarke MD Responsible Provider: David Cuba DO    Anesthesia Type: MAC ASA Status: 2            Anesthesia Type: MAC    Vitals Value Taken Time   /67 11/02/23 1420   Temp 36.7 °C (98.1 °F) 11/02/23 1420   Pulse 97 11/02/23 1420   Resp 16 11/02/23 1420   SpO2 97 % 11/02/23 1420       Anesthesia Post Evaluation    Patient location during evaluation: PACU  Level of consciousness: awake and alert  Pain management: satisfactory to patient  Multimodal analgesia pain management approach  Airway patency: patent  Cardiovascular status: acceptable  Respiratory status: acceptable  Hydration status: acceptable  Comments: No PONV    There were no known notable events for this encounter.

## 2023-11-02 NOTE — ANESTHESIA PREPROCEDURE EVALUATION
Patient: Van Hayes    Procedure Information       Date/Time: 11/02/23 1330    Procedure: Phacoemulsification Cataract with Insertion Intraocular Lens (Left: Eye)    Location: [facility] [place] [facility] OR 04 / [facility] [place] [facility]    Surgeons: Marivel Clarke MD            Relevant Problems   Anesthesia (within normal limits)      Cardiovascular  > 4 mets   (+) Benign essential HTN      Musculoskeletal   (+) Right knee DJD       Clinical information reviewed:   Tobacco  Allergies  Meds   Med Hx  Surg Hx   Fam Hx  Soc Hx        NPO Detail:  NPO/Void Status  Date of Last Liquid: 11/01/23  Time of Last Liquid: 2200  Date of Last Solid: 11/01/23  Time of Last Solid: 2200  Last Intake Type: Clear fluids; Food         Physical Exam    Airway  Mallampati: II  TM distance: >3 FB  Neck ROM: full     Cardiovascular    Dental - normal exam     Pulmonary    Abdominal        Anesthesia Plan    ASA 2     MAC     intravenous induction   Anesthetic plan and risks discussed with patient.    Plan discussed with CAA.

## 2023-11-02 NOTE — H&P
History Of Present Illness  Van Hayes is a 62 y.o. male presenting with a history of visually-significant cataract in the left eye here for cataract extraction and intraocular lens insertion of the left eye .     Past Medical History  Past Medical History:   Diagnosis Date    Hyperlipidemia     Hypertension     Melanoma (CMS/HCC)     Personal history of other endocrine, nutritional and metabolic disease 06/01/2021    History of familial combined hyperlipidemia       Surgical History  Past Surgical History:   Procedure Laterality Date    COLONOSCOPY      OTHER SURGICAL HISTORY      melanoma resected from left lower leg        Social History  He reports that he has never smoked. He has never used smokeless tobacco. He reports current alcohol use. He reports that he does not use drugs.    Family History  Family History   Problem Relation Name Age of Onset    Heart attack Father          Allergies  Patient has no known allergies.    Review of Systems   Constitutional: Negative.    HENT: Negative.     Eyes: Negative.    Respiratory: Negative.     Cardiovascular: Negative.    Gastrointestinal: Negative.    Endocrine: Negative.  .    Neurological: Negative.    Psychiatric/Behavioral: Negative.   Physical Exam   General: Alert and Oriented x3  Head and neck: atraumatic and normacephalic  Lungs: The chest wall is symmetric and without deformity. No signs of respiratory distress. Lung sounds are clear in all lobes bilaterally.   Heart: Regular rate and rhythm.   Abdomen: Soft and non-tender   Last Recorded Vitals  Blood pressure 133/82, pulse 68, temperature 36.1 °C (97 °F), temperature source Temporal, resp. rate 16, height 1.829 m (6'), weight 109 kg (240 lb 4.8 oz), SpO2 98 %.    Relevant Results           Assessment/Plan   Principal Problem:    Combined forms of age-related cataract of left eye  history of visually-significant cataract in the left eye here for cataract extraction and intraocular lens insertion of  the left eye              Kendra Byrd MD

## 2023-11-03 ENCOUNTER — DOCUMENTATION (OUTPATIENT)
Dept: PRIMARY CARE | Facility: CLINIC | Age: 63
End: 2023-11-03
Payer: COMMERCIAL

## 2023-11-03 ENCOUNTER — OFFICE VISIT (OUTPATIENT)
Dept: OPHTHALMOLOGY | Facility: CLINIC | Age: 63
End: 2023-11-03
Payer: COMMERCIAL

## 2023-11-03 DIAGNOSIS — H35.373 EPIRETINAL MEMBRANE (ERM) OF BOTH EYES: ICD-10-CM

## 2023-11-03 DIAGNOSIS — E78.00 ELEVATED LDL CHOLESTEROL LEVEL: Primary | ICD-10-CM

## 2023-11-03 DIAGNOSIS — H02.403 PTOSIS OF BOTH EYELIDS: ICD-10-CM

## 2023-11-03 DIAGNOSIS — Z96.1 PSEUDOPHAKIA: Primary | ICD-10-CM

## 2023-11-03 DIAGNOSIS — H40.003 GLAUCOMA SUSPECT OF BOTH EYES: ICD-10-CM

## 2023-11-03 DIAGNOSIS — H43.813 PVD (POSTERIOR VITREOUS DETACHMENT), BOTH EYES: ICD-10-CM

## 2023-11-03 DIAGNOSIS — H52.4 PRESBYOPIA: ICD-10-CM

## 2023-11-03 PROCEDURE — 99024 POSTOP FOLLOW-UP VISIT: CPT | Performed by: OPHTHALMOLOGY

## 2023-11-03 RX ORDER — SIMVASTATIN 20 MG/1
20 TABLET, FILM COATED ORAL NIGHTLY
Qty: 90 TABLET | Refills: 2 | Status: SHIPPED | OUTPATIENT
Start: 2023-11-03 | End: 2024-03-25 | Stop reason: SDUPTHER

## 2023-11-03 ASSESSMENT — CUP TO DISC RATIO
OS_RATIO: 0.65
OD_RATIO: 0.65

## 2023-11-03 ASSESSMENT — TONOMETRY
OS_IOP_MMHG: 17
IOP_METHOD: GOLDMANN APPLANATION

## 2023-11-03 ASSESSMENT — EXTERNAL EXAM - LEFT EYE: OS_EXAM: NORMAL

## 2023-11-03 ASSESSMENT — VISUAL ACUITY
METHOD: SNELLEN - LINEAR
OS_SC: 20/30

## 2023-11-03 ASSESSMENT — EXTERNAL EXAM - RIGHT EYE: OD_EXAM: NORMAL

## 2023-11-03 ASSESSMENT — ENCOUNTER SYMPTOMS: EYES NEGATIVE: 1

## 2023-11-03 NOTE — PATIENT INSTRUCTIONS
Surgeon: Marivel Clarke MD      Patient name: Van Hayes    Date of visit: November 3, 2023      left eye    Prednisolone acetate (pink or white cap) - 4 times a day    Ofloxacin (tan cap) - 4 times a day    Ketorolac (gray cap) - 4 times a day - finish bottle      Right eye    Prednisolone acetate (pink or white cap) - 4 times a day      No heavy lifting over 10-15 lbs, no bending over, do not get eye wet, no eye rubbing. Wear eye shield at bedtime and sunglasses/glasses during the day. Please call immediately if you develop any redness, pain, decreased vision, flashes, floaters.       Surgical Scheduler (during office hours): Dara: [phone]  Office phone (after hours): [phone] : [phone]                     Pager: [phone] for Dr. Adams

## 2023-11-03 NOTE — PROGRESS NOTES
Labs reviewed.  I told the patient to change his simvastatin dosage to daily dosing.  He will try to get more exercise and lose weight.  He will have a fasting lipid profile, BMP, hemoglobin A1c drawn in mid February 2024

## 2023-11-03 NOTE — PROGRESS NOTES
Pseudophakia of left eyeZ96.1 - 11/2/23  -Doing well. Good vision. IOP good.  Prednisolone acetate 1% - 4 times a day  Ofloxacin - 4 times a day  Ketorolac - 4 times a day - finish bottle (backorder)  -Was noticing floaters OD more prior to CEIOL OS, however, now that cataract has been removed OS, he is not noticing the floaters as much. No flashes.   No heavy lifting over 10-15 lbs, no bending over, do not get eye wet, no eye rubbing. Wear eye shield at bedtime and sunglasses/glasses during the day. Advised to call if any redness, pain, decreased vision, flashes, floaters. F/u 1 week - refract both eyes (autorefract first), check uncorrected near vision each eye.     Pseudophakia of right eyeZ96.1 - 9/7/23  -Doing well. Good vision. IOP good. Off all gtts. Residual inflammation.   -Restart Prednisolone acetate 1% - 4 times a day    Glaucoma suspect of both eyesH40.003  -(+)FH glaucoma - mother  -Increased cup to disc ratio  -Pachymetry (5/17/23) - 566/534  -OCT RNFL (5/17/23) - SS: 7/10 OD and 6/10 OS. OD: Thin S, bord T. OS: Thin S. 71/74. However, poor image capture superiorly OU.   -History of HVF testing in the past, but no history of treatment for glaucoma  -Will defer treatment for now and monitor. Will plan to repeat pachymetry OU (asymmetric values today) and OCT RNFL following healing from CEIOL OU. Will monitor IOP carefully perioperatively.     Epiretinal membrane (ERM) of both eyesH35.373  -OCT macula (5/17/23) - SS: 7/10 OU. OD: Normal thickness and contour. Mild ERM with mild surface wrinkling nasal to fovea. Intact IS-OS. No edema. OS: ERM with distortion of foveal contour with lamellar vs pseudohole. 325/348.  -Guarded visual prognosis with cataract surgery. Will consider referral to retina service in the future following CEIOL if no significant improvement in vision. Offered visit with retina specialist prior to CEIOL, however, patient agrees will see retina service as needed following CEIOL  OU.     PVD (posterior vitreous detachment), both eyesH43.813  -No retinal tear or detachment seen on exam. Retinal detachment symptoms discussed.     JzpocfW78.409  -May be related to long history of RGP wear. Can consider referral to oculoplastic surgeon in the future.     QcednaS70.10  NuqsifmjmjhZ45.209  MrhcemoaygS37.4  -RGP for 40 years   -F/u 1 week - refract both eyes (autorefract first), check uncorrected near vision each eye.   -At final postop visit, would like Rx for progressives.       No history of refractive surgery.   No FH of AMD

## 2023-11-05 LAB
TESTOSTERONE FREE (CHAN): 83.1 PG/ML (ref 35–155)
TESTOSTERONE,TOTAL,LC-MS/MS: 472 NG/DL (ref 250–1100)

## 2023-11-05 ASSESSMENT — EXTERNAL EXAM - LEFT EYE: OS_EXAM: NORMAL

## 2023-11-05 ASSESSMENT — EXTERNAL EXAM - RIGHT EYE: OD_EXAM: NORMAL

## 2023-11-06 NOTE — PATIENT INSTRUCTIONS
Surgeon: Marivel Clarke MD                   [phone]      Patient name: Van Hayes    Date of visit: 11/8/23      left eye    Prednisolone acetate (pink or white cap) - 4 times a day for 1 week, 3 times a day for 1 week, 2 times a day for 1 week, once a day for 1 week, then stop    Ofloxacin (tan cap) - 4 times a day for 1 more week, then stop    Ketorolac (gray cap) - 4 times a day for 1 more week, then stop (just finish bottle)    Right eye    Prednisolone acetate (pink or white cap) - 4 times a day for 1 week, then stop      No heavy lifting over 15-20 lbs, try not to get eye wet, no eye rubbing. You may stop wearing the eye shield at night. Please continue wearing glasses or sunglasses during the day to protect your eyes. Please call immediately if you develop any redness, pain, decreased vision, flashes, floaters.   Surgical Scheduler (during office hours) - Dara: [phone]

## 2023-11-06 NOTE — PROGRESS NOTES
Pseudophakia of left eyeZ96.1 - 11/2/23  -Doing well. Good vision. IOP good.  Prednisolone acetate 1% - 4/3/2/1 weekly taper  Ofloxacin - 4 times a day for 1 week, then stop  Ketorolac - 4 times a day - finish bottle (backorder)  -Was noticing floaters OD more prior to CEIOL OS, however, now that cataract has been removed OS, he is not noticing the floaters as much. No flashes.   No heavy lifting over 15-20 lbs, do not get eye wet, no eye rubbing. Discontinue eye shield at bedtime but wear sunglasses/glasses during the day. Advised to call if any redness, pain, decreased vision, flashes, floaters. F/u 6-8 weeks - refract both eyes, check uncorrected near vision OU. dilate both eyes, OCT macula OU.     Pseudophakia of right eyeZ96.1 - 9/7/23  -Doing well. Good vision. IOP good. Off all gtts. Residual inflammation.   -Restart Prednisolone acetate 1% - 4 times a day for 1 week, then stop    Glaucoma suspect of both eyesH40.003  -(+)FH glaucoma - mother  -Increased cup to disc ratio  -Pachymetry (5/17/23) - 566/534  -OCT RNFL (5/17/23) - SS: 7/10 OD and 6/10 OS. OD: Thin S, bord T. OS: Thin S. 71/74. However, poor image capture superiorly OU.   -History of HVF testing in the past, but no history of treatment for glaucoma  -Will defer treatment for now and monitor. Will plan to repeat pachymetry OU (asymmetric values today) and OCT RNFL following healing from CEIOL OU. Will monitor IOP carefully perioperatively.     Epiretinal membrane (ERM) of both eyesH35.373  -OCT macula (5/17/23) - SS: 7/10 OU. OD: Normal thickness and contour. Mild ERM with mild surface wrinkling nasal to fovea. Intact IS-OS. No edema. OS: ERM with distortion of foveal contour with lamellar vs pseudohole. 325/348.  -Guarded visual prognosis with cataract surgery. Will consider referral to retina service in the future following CEIOL if no significant improvement in vision. Offered visit with retina specialist prior to CEIOL, however, patient  agrees will see retina service as needed following CEIOL OU.     PVD (posterior vitreous detachment), both eyesH43.813  -No retinal tear or detachment seen on exam. Retinal detachment symptoms discussed.     GnumemL15.409  -May be related to long history of RGP wear. Can consider referral to oculoplastic surgeon in the future.     QpqgqwL52.10  PhcmssuohatZ89.209  TveqpzflyzM47.4  -RGP for 40 years   -F/u 6-8 weeks - refract both eyes, check uncorrected near vision OU. dilate both eyes, OCT macula OU.   -At final postop visit, would like Rx for progressives.       No history of refractive surgery.   No FH of AMD

## 2023-11-08 ENCOUNTER — OFFICE VISIT (OUTPATIENT)
Dept: OPHTHALMOLOGY | Facility: CLINIC | Age: 63
End: 2023-11-08
Payer: COMMERCIAL

## 2023-11-08 DIAGNOSIS — H52.4 PRESBYOPIA: ICD-10-CM

## 2023-11-08 DIAGNOSIS — H02.403 PTOSIS OF BOTH EYELIDS: ICD-10-CM

## 2023-11-08 DIAGNOSIS — H35.373 EPIRETINAL MEMBRANE (ERM) OF BOTH EYES: ICD-10-CM

## 2023-11-08 DIAGNOSIS — H40.003 GLAUCOMA SUSPECT OF BOTH EYES: ICD-10-CM

## 2023-11-08 DIAGNOSIS — Z96.1 PSEUDOPHAKIA: Primary | ICD-10-CM

## 2023-11-08 DIAGNOSIS — H43.813 PVD (POSTERIOR VITREOUS DETACHMENT), BOTH EYES: ICD-10-CM

## 2023-11-08 PROCEDURE — 99024 POSTOP FOLLOW-UP VISIT: CPT | Performed by: OPHTHALMOLOGY

## 2023-11-08 ASSESSMENT — REFRACTION_MANIFEST
OS_SPHERE: -0.50
OS_AXIS: 090
OS_CYLINDER: -0.50
OS_AXIS: 090
OD_AXIS: 175
METHOD_AUTOREFRACTION: 1
OS_CYLINDER: -0.50
OD_CYLINDER: -0.25
OD_SPHERE: +0.00
OD_SPHERE: +0.00
OS_SPHERE: -0.50

## 2023-11-08 ASSESSMENT — VISUAL ACUITY
OS_PH_SC: J5
OS_SC: 20/30
OD_SC: 20/20
METHOD: SNELLEN - LINEAR
OD_PH_SC: J10

## 2023-11-08 ASSESSMENT — ENCOUNTER SYMPTOMS: EYES NEGATIVE: 1

## 2023-11-08 ASSESSMENT — TONOMETRY
IOP_METHOD: GOLDMANN APPLANATION
OS_IOP_MMHG: 14
OD_IOP_MMHG: 15

## 2023-11-27 ENCOUNTER — OFFICE VISIT (OUTPATIENT)
Dept: DERMATOLOGY | Facility: CLINIC | Age: 63
End: 2023-11-27
Payer: COMMERCIAL

## 2023-11-27 DIAGNOSIS — Z00.00 HEALTHCARE MAINTENANCE: ICD-10-CM

## 2023-11-27 DIAGNOSIS — D22.9 NEVUS: Primary | ICD-10-CM

## 2023-11-27 PROCEDURE — 17273 DSTR MAL LES S/N/H/F/G 2.1-3: CPT | Performed by: SPECIALIST

## 2023-11-27 PROCEDURE — 1036F TOBACCO NON-USER: CPT | Performed by: SPECIALIST

## 2023-11-27 PROCEDURE — 99214 OFFICE O/P EST MOD 30 MIN: CPT | Performed by: SPECIALIST

## 2023-11-27 PROCEDURE — 88305 TISSUE EXAM BY PATHOLOGIST: CPT | Mod: TC,DER | Performed by: SPECIALIST

## 2023-11-27 PROCEDURE — 88305 TISSUE EXAM BY PATHOLOGIST: CPT | Performed by: DERMATOLOGY

## 2023-11-27 RX ORDER — MUPIROCIN 20 MG/G
OINTMENT TOPICAL
Qty: 15 G | Refills: 0 | Status: SHIPPED | OUTPATIENT
Start: 2023-11-27 | End: 2024-02-23 | Stop reason: HOSPADM

## 2023-11-27 RX ORDER — IRBESARTAN 150 MG/1
150 TABLET ORAL DAILY
Qty: 90 TABLET | Refills: 1 | Status: ON HOLD | OUTPATIENT
Start: 2023-11-27 | End: 2024-02-23 | Stop reason: SDUPTHER

## 2023-11-27 NOTE — PROGRESS NOTES
Patient is here for a full body exam. Full body exam done in the presence of chaperone. Eyes:  Conjunctiva normal lids normal. Mouth and throat: Lips normal teeth normal gums normal.  Oropharynx is moist and normal. Neck: Neck is supple without masses. CV: Extremities are  normal without calf tenderness edema varicosities. Abdomen: Is no hepatosplenomegaly.  Lymphatic: Is no cervical axillary or inguinal lymphadenopathy. Extremities: Inspection palpation  of digits and nails is normal without clubbing or cyanosis. Neuro/psych: Orientation to time,  place, person situation is normal. Mood/affect is normal. Skin: Palpation of scalp is normal  inspection of Hair and scalp, eyebrows, face, and extremities normal. Inspection/palpation of  Head/face mild photo damage. Neck mild photo damage. Chest mild photo damage. Breasts are  normal axillary vaults are normal. Abdomen normal.   Genitalia normal groin normal, buttocks normal. Back mild photo damage. Right upper extremity photo damage. Left upper extremity photo damage. Right lower extremity photo damage. Left lower extremity normal. Inspection of eccrine apocrine glands of skin and subcutaneous tissues normal.    Lillie Hayes is a 62 y.o. male who presents for the following: Body Exam.     Review of Systems:  No other skin or systemic complaints other than what is documented elsewhere in the note.    The following portions of the chart were reviewed this encounter and updated as appropriate:          Skin Cancer History  No skin cancer on file.      Specialty Problems          Dermatology Problems    Dermatitis, unspecified    Generalized skin eruption due to drugs and medicaments taken internally    Melanocytic nevi of left lower limb, including hip    Melanocytic nevi of trunk    Other melanin hyperpigmentation    Rash and other nonspecific skin eruption    Tinea pedis    Malignant melanoma of left lower leg (CMS/HCC)    Nevus of left thigh         Objective   Well appearing patient in no apparent distress; mood and affect are within normal limits.    A focused skin examination was performed. All findings within normal limits unless otherwise noted below.    Assessment/Plan   1. Nevus  Right Supraclavicular Area    Shave removal - Right Supraclavicular Area    Timeout: patient name, date of birth, surgical site, and procedure verified      Specimen 1 - Dermatopathology- DERM LAB  Differential Diagnosis: Dysplastic Nevus  Check Margins Yes/No?:    Comments:    Dermpath Lab: Routine Histopathology (formalin-fixed tissue)

## 2023-11-27 NOTE — PROGRESS NOTES
Lillie Hayes is a 62 y.o. male who presents for the following: Body Exam.     Review of Systems:  No other skin or systemic complaints other than what is documented elsewhere in the note.    The following portions of the chart were reviewed this encounter and updated as appropriate:            Specialty Problems          Dermatology Problems    Dermatitis, unspecified    Generalized skin eruption due to drugs and medicaments taken internally    Melanocytic nevi of left lower limb, including hip    Melanocytic nevi of trunk    Other melanin hyperpigmentation    Rash and other nonspecific skin eruption    Tinea pedis    Malignant melanoma of left lower leg (CMS/HCC)    Nevus of left thigh        Objective   Well appearing patient in no apparent distress; mood and affect are within normal limits.    A focused skin examination was performed. All findings within normal limits unless otherwise noted below.    Assessment/Plan   1. Nevus  Right Supraclavicular Area    Shave removal - Right Supraclavicular Area    Timeout: patient name, date of birth, surgical site, and procedure verified      Specimen 1 - Dermatopathology- DERM LAB  Differential Diagnosis: Dysplastic Nevus  Check Margins Yes/No?:    Comments:    Dermpath Lab: Routine Histopathology (formalin-fixed tissue)        Assessment/Plan: Patient has an irregularly pigmented bordered nevus heavily photodamaged area right  supraclavicular area.  Patient also has a past history of malignant melanoma.    Patient is here for a full body exam. Full body exam done in the presence of chaperone.     Eyes: Conjunctiva normal lids normal. Mouth and throat: Lips normal teeth normal gums normal.  Oropharynx is moist and normal.   Neck: Neck is supple without masses.   CV: Extremities are  normal without calf tenderness edema varicosities.   Abdomen: Is no hepatosplenomegaly.  Lymphatic: Is no cervical axillary or inguinal lymphadenopathy.   Extremities:  Inspection palpation  of digits and nails is normal without clubbing or cyanosis.   Neuro/psych: Orientation to time,  place, person situation is normal.   Mood/affect is normal.   Skin: Palpation of scalp is normal  inspection of Hair and scalp, eyebrows, face, and extremities normal. Inspection/palpation of  Head/face mild photo damage. Neck mild photo damage. Chest mild photo damage. Breasts are  normal axillary vaults are normal. Abdomen normal.   Genitalia normal groin normal, buttocks normal. Back mild photo damage. Right upper extremity photo damage. Left upper extremity photo damage. Right lower extremity photo damage. Left lower extremity normal. Inspection of eccrine apocrine glands of skin and subcutaneous tissues normal.

## 2023-11-29 LAB
LABORATORY COMMENT REPORT: NORMAL
PATH REPORT.FINAL DX SPEC: NORMAL
PATH REPORT.GROSS SPEC: NORMAL
PATH REPORT.MICROSCOPIC SPEC OTHER STN: NORMAL
PATH REPORT.RELEVANT HX SPEC: NORMAL
PATH REPORT.TOTAL CANCER: NORMAL

## 2023-12-20 ENCOUNTER — HOSPITAL ENCOUNTER (OUTPATIENT)
Dept: RADIOLOGY | Facility: EXTERNAL LOCATION | Age: 63
Discharge: HOME | End: 2023-12-20

## 2023-12-20 DIAGNOSIS — M25.511 RIGHT SHOULDER PAIN, UNSPECIFIED CHRONICITY: ICD-10-CM

## 2023-12-24 NOTE — PROGRESS NOTES
Subjective   Patient ID: Van Hayes is a 63 y.o. male who presents for follow-up visit    HPI     Review of Systems    Objective   There were no vitals taken for this visit.    Physical Exam    Assessment/Plan   {Assess/PlanSmartLinks:52700}

## 2023-12-26 ENCOUNTER — TELEPHONE (OUTPATIENT)
Dept: PRIMARY CARE | Facility: CLINIC | Age: 63
End: 2023-12-26

## 2023-12-26 NOTE — TELEPHONE ENCOUNTER
Fell 1 week ago thinks something happened to shoulder was told to see orthopedic surgeon will need a referral to one in  system

## 2023-12-27 ENCOUNTER — APPOINTMENT (OUTPATIENT)
Dept: PRIMARY CARE | Facility: CLINIC | Age: 63
End: 2023-12-27
Payer: COMMERCIAL

## 2023-12-29 ENCOUNTER — OFFICE VISIT (OUTPATIENT)
Dept: ORTHOPEDIC SURGERY | Facility: HOSPITAL | Age: 63
End: 2023-12-29
Payer: COMMERCIAL

## 2023-12-29 ENCOUNTER — APPOINTMENT (OUTPATIENT)
Dept: RADIOLOGY | Facility: HOSPITAL | Age: 63
End: 2023-12-29
Payer: COMMERCIAL

## 2023-12-29 DIAGNOSIS — M25.511 RIGHT SHOULDER PAIN, UNSPECIFIED CHRONICITY: ICD-10-CM

## 2023-12-29 PROCEDURE — 99203 OFFICE O/P NEW LOW 30 MIN: CPT | Performed by: ORTHOPAEDIC SURGERY

## 2023-12-29 PROCEDURE — 1036F TOBACCO NON-USER: CPT | Performed by: ORTHOPAEDIC SURGERY

## 2023-12-29 PROCEDURE — 99213 OFFICE O/P EST LOW 20 MIN: CPT | Performed by: ORTHOPAEDIC SURGERY

## 2023-12-29 RX ORDER — CYCLOBENZAPRINE HCL 10 MG
10 TABLET ORAL NIGHTLY PRN
Qty: 7 TABLET | Refills: 0 | Status: SHIPPED | OUTPATIENT
Start: 2023-12-29 | End: 2024-02-23 | Stop reason: HOSPADM

## 2023-12-29 NOTE — PROGRESS NOTES
Patient is here for evaluation of right shoulder injuries right shoulder falls approximately 2 to 3 weeks ago.  He has severe pain in the shoulder and cannot elevate without pain.  Pulling covers over his body at nighttime in bed is very painful.  No history of prior problems with the right shoulder.  X-rays were obtained and are available for review.    The patient is pleasant and cooperative.  The patient is alert and oriented ×3.  Auditory function is intact.  The patient is a good historian.  The patient is not in acute distress.  Eye exam significant for nonicteric sclera, intact ocular muscle movement.  Breathing is rhythmic symmetric and nonlabored.  Right shoulder active elevation 90 degrees motor power 3/5 external rotation 0 degrees motor power 4/5 internal rotation to umbilicus motor power 5/5.  Biceps flexion is 55/5 and biceps contour is intact.  There is bruising over the anterior shoulder over the biceps.  Right radial pulse easily palpable brisk capillary refill light touch sensation intact.    X-rays show well-circumscribed lytic area in the greater tuberosity with no obvious fracture.  Subacromial glenohumeral spaces well-preserved.    Right shoulder pain after injury    Discussed differential diagnosis including occult fracture of the greater tuberosity and rotator cuff tear.  I recommended further evaluation by MRI scan I would like to see the patient back when that is been obtained.  I did give the patient a prescription for Flexeril to use at night for his muscle spasms.    This was dictated using voice recognition software and not corrected for grammatical or spelling errors.

## 2024-01-02 ASSESSMENT — CUP TO DISC RATIO
OD_RATIO: 0.65
OS_RATIO: 0.65

## 2024-01-02 ASSESSMENT — EXTERNAL EXAM - RIGHT EYE: OD_EXAM: NORMAL

## 2024-01-02 ASSESSMENT — EXTERNAL EXAM - LEFT EYE: OS_EXAM: NORMAL

## 2024-01-02 NOTE — PROGRESS NOTES
Pseudophakia of left eyeZ96.1 - 11/2/23  Pseudophakia of right eyeZ96.1 - 9/7/23  -Doing well. Good vision. IOP good. Off all gtts.   -Was noticing floaters OD more prior to CEIOL OS, however, now that cataract has been removed OS, he is not noticing the floaters as much. No flashes.   -F/u 6-8 months - refract OU, dilate OU, OCT RNFL OU, OCT macula OU, repeat pachymetry (asymmetric values previously).     Glaucoma suspect of both eyesH40.003  -(+)FH glaucoma - mother  -Increased cup to disc ratio  -Pachymetry (5/17/23) - 566/534  -OCT RNFL (5/17/23) - SS: 7/10 OD and 6/10 OS. OD: Thin S, bord T. OS: Thin S. 71/74. However, poor image capture superiorly OU.   -History of HVF testing in the past, but no history of treatment for glaucoma  -Will defer treatment for now and monitor. F/u 6-8 months - refract OU, dilate OU, OCT RNFL OU, OCT macula OU, repeat pachymetry (asymmetric values previously).     Epiretinal membrane (ERM) of both eyesH35.373  -OCT macula (1/3/24) - SS: 10/10 OD and 9/10 OS. OD: Normal thickness and contour. Mild ERM with mild surface wrinkling. No edema. OS: ERM with distortion of foveal contour with lamellar vs pseudohole. 346/354. Stable from 5/17/23.   -Vision OS decreased postoperatively partially due to refractive error (best corrected with refraction to 20/20), but also likely due to ERM OS>OD. Discussed ERM today, discussed diagnosis and prognosis. Recommended evaluation with retina service.     PVD (posterior vitreous detachment), both eyesH43.813  -No retinal tear or detachment seen on exam. Retinal detachment symptoms discussed.     LxeuyuN30.409  -May be related to long history of RGP wear. Can consider referral to oculoplastic surgeon in the future.     XxupajW36.10  LshwmeiodygC69.209  ZaokrptlsmW15.4  -RGP for 40 years   -F/u 6-8 months - refract OU, dilate OU, OCT RNFL OU, OCT macula OU, repeat pachymetry (asymmetric values previously).     No history of refractive surgery.    No FH of AMD

## 2024-01-03 ENCOUNTER — OFFICE VISIT (OUTPATIENT)
Dept: OPHTHALMOLOGY | Facility: CLINIC | Age: 64
End: 2024-01-03
Payer: COMMERCIAL

## 2024-01-03 DIAGNOSIS — Z96.1 PSEUDOPHAKIA: Primary | ICD-10-CM

## 2024-01-03 DIAGNOSIS — H52.4 PRESBYOPIA: ICD-10-CM

## 2024-01-03 DIAGNOSIS — H43.813 PVD (POSTERIOR VITREOUS DETACHMENT), BOTH EYES: ICD-10-CM

## 2024-01-03 DIAGNOSIS — H40.003 GLAUCOMA SUSPECT OF BOTH EYES: ICD-10-CM

## 2024-01-03 DIAGNOSIS — H02.403 PTOSIS OF BOTH EYELIDS: ICD-10-CM

## 2024-01-03 DIAGNOSIS — H35.373 EPIRETINAL MEMBRANE (ERM) OF BOTH EYES: ICD-10-CM

## 2024-01-03 PROCEDURE — 92134 CPTRZ OPH DX IMG PST SGM RTA: CPT | Mod: BILATERAL PROCEDURE | Performed by: OPHTHALMOLOGY

## 2024-01-03 PROCEDURE — 99024 POSTOP FOLLOW-UP VISIT: CPT | Performed by: OPHTHALMOLOGY

## 2024-01-03 ASSESSMENT — VISUAL ACUITY
METHOD: SNELLEN - LINEAR
OS_SC+: -2
OD_SC: 20/20
OS_SC: 20/50
OD_SC: J3
OS_SC: J3

## 2024-01-03 ASSESSMENT — REFRACTION_MANIFEST
OS_ADD: +2.50
OD_ADD: +2.50
OD_SPHERE: +0.00
OS_SPHERE: -0.50
OS_CYLINDER: -0.50
OD_AXIS: 175
OS_AXIS: 090
OD_CYLINDER: -0.25

## 2024-01-03 ASSESSMENT — ENCOUNTER SYMPTOMS: EYES NEGATIVE: 1

## 2024-01-03 ASSESSMENT — TONOMETRY
OS_IOP_MMHG: 16
IOP_METHOD: GOLDMANN APPLANATION
OD_IOP_MMHG: 15

## 2024-01-17 ENCOUNTER — APPOINTMENT (OUTPATIENT)
Dept: RADIOLOGY | Facility: CLINIC | Age: 64
End: 2024-01-17
Payer: COMMERCIAL

## 2024-02-14 ENCOUNTER — OFFICE VISIT (OUTPATIENT)
Dept: ORTHOPEDIC SURGERY | Facility: HOSPITAL | Age: 64
End: 2024-02-14
Payer: COMMERCIAL

## 2024-02-14 DIAGNOSIS — S46.011D TRAUMATIC COMPLETE TEAR OF RIGHT ROTATOR CUFF, SUBSEQUENT ENCOUNTER: Primary | ICD-10-CM

## 2024-02-14 PROCEDURE — 1036F TOBACCO NON-USER: CPT | Performed by: ORTHOPAEDIC SURGERY

## 2024-02-14 PROCEDURE — 99213 OFFICE O/P EST LOW 20 MIN: CPT | Performed by: ORTHOPAEDIC SURGERY

## 2024-02-14 NOTE — PROGRESS NOTES
Here for follow-up MRI scan right shoulder.  Continues to have symptoms.    MRI scan shows tendinopathy and a large full-thickness tear of the supraspinatus and partial infraspinatus.    Rotator cuff tear right shoulder    We did detailed discussion about the treatment options and alternatives including potential benefits possible risks possible failure and success rate and alternatives to surgery.  Patient is in pursuing surgery the procedure be right shoulder arthroscopy with repair of rotator cuff.  Patient would like to schedule sometime in early April.    This was dictated using voice recognition software and not corrected for grammatical or spelling errors.

## 2024-02-19 DIAGNOSIS — M75.121 COMPLETE TEAR OF RIGHT ROTATOR CUFF, UNSPECIFIED WHETHER TRAUMATIC: ICD-10-CM

## 2024-02-22 ENCOUNTER — ANESTHESIA EVENT (OUTPATIENT)
Dept: OPERATING ROOM | Facility: HOSPITAL | Age: 64
End: 2024-02-22
Payer: COMMERCIAL

## 2024-02-22 ENCOUNTER — ANESTHESIA (OUTPATIENT)
Dept: OPERATING ROOM | Facility: HOSPITAL | Age: 64
End: 2024-02-22
Payer: COMMERCIAL

## 2024-02-22 ENCOUNTER — HOSPITAL ENCOUNTER (OUTPATIENT)
Facility: HOSPITAL | Age: 64
Setting detail: OBSERVATION
Discharge: HOME | End: 2024-02-23
Attending: EMERGENCY MEDICINE | Admitting: SURGERY
Payer: COMMERCIAL

## 2024-02-22 ENCOUNTER — APPOINTMENT (OUTPATIENT)
Dept: RADIOLOGY | Facility: HOSPITAL | Age: 64
End: 2024-02-22
Payer: COMMERCIAL

## 2024-02-22 DIAGNOSIS — K35.80 ACUTE APPENDICITIS, UNSPECIFIED ACUTE APPENDICITIS TYPE: Primary | ICD-10-CM

## 2024-02-22 DIAGNOSIS — K35.30 ACUTE APPENDICITIS WITH LOCALIZED PERITONITIS, UNSPECIFIED WHETHER ABSCESS PRESENT, UNSPECIFIED WHETHER GANGRENE PRESENT, UNSPECIFIED WHETHER PERFORATION PRESENT: ICD-10-CM

## 2024-02-22 DIAGNOSIS — K37 APPENDICITIS, UNSPECIFIED APPENDICITIS TYPE: ICD-10-CM

## 2024-02-22 DIAGNOSIS — Z00.00 HEALTHCARE MAINTENANCE: ICD-10-CM

## 2024-02-22 DIAGNOSIS — Z90.49 S/P APPENDECTOMY: ICD-10-CM

## 2024-02-22 PROBLEM — E78.5 HYPERLIPIDEMIA: Status: ACTIVE | Noted: 2024-02-22

## 2024-02-22 PROBLEM — E66.9 OBESITY: Status: ACTIVE | Noted: 2024-02-22

## 2024-02-22 LAB
ALBUMIN SERPL BCP-MCNC: 4.4 G/DL (ref 3.4–5)
ALP SERPL-CCNC: 78 U/L (ref 33–136)
ALT SERPL W P-5'-P-CCNC: 20 U/L (ref 10–52)
ANION GAP SERPL CALC-SCNC: 12 MMOL/L (ref 10–20)
APPEARANCE UR: CLEAR
AST SERPL W P-5'-P-CCNC: 17 U/L (ref 9–39)
BASOPHILS # BLD AUTO: 0.04 X10*3/UL (ref 0–0.1)
BASOPHILS NFR BLD AUTO: 0.3 %
BILIRUB SERPL-MCNC: 1.1 MG/DL (ref 0–1.2)
BILIRUB UR STRIP.AUTO-MCNC: NEGATIVE MG/DL
BUN SERPL-MCNC: 16 MG/DL (ref 6–23)
CALCIUM SERPL-MCNC: 8.8 MG/DL (ref 8.6–10.3)
CHLORIDE SERPL-SCNC: 103 MMOL/L (ref 98–107)
CO2 SERPL-SCNC: 24 MMOL/L (ref 21–32)
COLOR UR: YELLOW
CREAT SERPL-MCNC: 0.99 MG/DL (ref 0.5–1.3)
EGFRCR SERPLBLD CKD-EPI 2021: 86 ML/MIN/1.73M*2
EOSINOPHIL # BLD AUTO: 0.27 X10*3/UL (ref 0–0.7)
EOSINOPHIL NFR BLD AUTO: 2.2 %
ERYTHROCYTE [DISTWIDTH] IN BLOOD BY AUTOMATED COUNT: 12.1 % (ref 11.5–14.5)
GLUCOSE SERPL-MCNC: 121 MG/DL (ref 74–99)
GLUCOSE UR STRIP.AUTO-MCNC: NEGATIVE MG/DL
HCT VFR BLD AUTO: 45.6 % (ref 41–52)
HGB BLD-MCNC: 15.5 G/DL (ref 13.5–17.5)
IMM GRANULOCYTES # BLD AUTO: 0.05 X10*3/UL (ref 0–0.7)
IMM GRANULOCYTES NFR BLD AUTO: 0.4 % (ref 0–0.9)
KETONES UR STRIP.AUTO-MCNC: NEGATIVE MG/DL
LACTATE SERPL-SCNC: 0.9 MMOL/L (ref 0.4–2)
LEUKOCYTE ESTERASE UR QL STRIP.AUTO: NEGATIVE
LIPASE SERPL-CCNC: 18 U/L (ref 9–82)
LYMPHOCYTES # BLD AUTO: 0.91 X10*3/UL (ref 1.2–4.8)
LYMPHOCYTES NFR BLD AUTO: 7.3 %
MCH RBC QN AUTO: 30.8 PG (ref 26–34)
MCHC RBC AUTO-ENTMCNC: 34 G/DL (ref 32–36)
MCV RBC AUTO: 91 FL (ref 80–100)
MONOCYTES # BLD AUTO: 0.84 X10*3/UL (ref 0.1–1)
MONOCYTES NFR BLD AUTO: 6.7 %
NEUTROPHILS # BLD AUTO: 10.42 X10*3/UL (ref 1.2–7.7)
NEUTROPHILS NFR BLD AUTO: 83.1 %
NITRITE UR QL STRIP.AUTO: NEGATIVE
NRBC BLD-RTO: 0 /100 WBCS (ref 0–0)
PH UR STRIP.AUTO: 5 [PH]
PLATELET # BLD AUTO: 177 X10*3/UL (ref 150–450)
POTASSIUM SERPL-SCNC: 4.1 MMOL/L (ref 3.5–5.3)
PROT SERPL-MCNC: 7.1 G/DL (ref 6.4–8.2)
PROT UR STRIP.AUTO-MCNC: NEGATIVE MG/DL
RBC # BLD AUTO: 5.04 X10*6/UL (ref 4.5–5.9)
RBC # UR STRIP.AUTO: NEGATIVE /UL
SODIUM SERPL-SCNC: 135 MMOL/L (ref 136–145)
SP GR UR STRIP.AUTO: 1.03
UROBILINOGEN UR STRIP.AUTO-MCNC: 2 MG/DL
WBC # BLD AUTO: 12.5 X10*3/UL (ref 4.4–11.3)

## 2024-02-22 PROCEDURE — 7100000002 HC RECOVERY ROOM TIME - EACH INCREMENTAL 1 MINUTE: Performed by: SURGERY

## 2024-02-22 PROCEDURE — 2500000005 HC RX 250 GENERAL PHARMACY W/O HCPCS: Performed by: ANESTHESIOLOGIST ASSISTANT

## 2024-02-22 PROCEDURE — 3700000001 HC GENERAL ANESTHESIA TIME - INITIAL BASE CHARGE: Performed by: SURGERY

## 2024-02-22 PROCEDURE — 96375 TX/PRO/DX INJ NEW DRUG ADDON: CPT

## 2024-02-22 PROCEDURE — 2500000004 HC RX 250 GENERAL PHARMACY W/ HCPCS (ALT 636 FOR OP/ED): Performed by: ANESTHESIOLOGIST ASSISTANT

## 2024-02-22 PROCEDURE — 96361 HYDRATE IV INFUSION ADD-ON: CPT

## 2024-02-22 PROCEDURE — 83605 ASSAY OF LACTIC ACID: CPT

## 2024-02-22 PROCEDURE — 81003 URINALYSIS AUTO W/O SCOPE: CPT

## 2024-02-22 PROCEDURE — 74177 CT ABD & PELVIS W/CONTRAST: CPT | Performed by: RADIOLOGY

## 2024-02-22 PROCEDURE — G0378 HOSPITAL OBSERVATION PER HR: HCPCS

## 2024-02-22 PROCEDURE — 2500000004 HC RX 250 GENERAL PHARMACY W/ HCPCS (ALT 636 FOR OP/ED): Performed by: ANESTHESIOLOGY

## 2024-02-22 PROCEDURE — 74177 CT ABD & PELVIS W/CONTRAST: CPT

## 2024-02-22 PROCEDURE — 99285 EMERGENCY DEPT VISIT HI MDM: CPT | Mod: 25

## 2024-02-22 PROCEDURE — 80053 COMPREHEN METABOLIC PANEL: CPT

## 2024-02-22 PROCEDURE — A44970 PR LAP,APPENDECTOMY: Performed by: ANESTHESIOLOGIST ASSISTANT

## 2024-02-22 PROCEDURE — 2550000001 HC RX 255 CONTRASTS: Performed by: EMERGENCY MEDICINE

## 2024-02-22 PROCEDURE — 0752T DGTZ GLS MCRSCP SLD LVL III: CPT | Mod: TC,AHULAB | Performed by: SURGERY

## 2024-02-22 PROCEDURE — 83690 ASSAY OF LIPASE: CPT

## 2024-02-22 PROCEDURE — 2500000004 HC RX 250 GENERAL PHARMACY W/ HCPCS (ALT 636 FOR OP/ED)

## 2024-02-22 PROCEDURE — 2780000003 HC OR 278 NO HCPCS: Performed by: SURGERY

## 2024-02-22 PROCEDURE — 3600000004 HC OR TIME - INITIAL BASE CHARGE - PROCEDURE LEVEL FOUR: Performed by: SURGERY

## 2024-02-22 PROCEDURE — 3600000009 HC OR TIME - EACH INCREMENTAL 1 MINUTE - PROCEDURE LEVEL FOUR: Performed by: SURGERY

## 2024-02-22 PROCEDURE — 36415 COLL VENOUS BLD VENIPUNCTURE: CPT

## 2024-02-22 PROCEDURE — 99418 PROLNG IP/OBS E/M EA 15 MIN: CPT | Performed by: NURSE PRACTITIONER

## 2024-02-22 PROCEDURE — 88304 TISSUE EXAM BY PATHOLOGIST: CPT | Performed by: PATHOLOGY

## 2024-02-22 PROCEDURE — 99140 ANES COMP EMERGENCY COND: CPT | Performed by: ANESTHESIOLOGY

## 2024-02-22 PROCEDURE — 99223 1ST HOSP IP/OBS HIGH 75: CPT | Performed by: NURSE PRACTITIONER

## 2024-02-22 PROCEDURE — 2500000004 HC RX 250 GENERAL PHARMACY W/ HCPCS (ALT 636 FOR OP/ED): Performed by: INTERNAL MEDICINE

## 2024-02-22 PROCEDURE — 85025 COMPLETE CBC W/AUTO DIFF WBC: CPT

## 2024-02-22 PROCEDURE — 2500000005 HC RX 250 GENERAL PHARMACY W/O HCPCS: Performed by: SURGERY

## 2024-02-22 PROCEDURE — 2500000001 HC RX 250 WO HCPCS SELF ADMINISTERED DRUGS (ALT 637 FOR MEDICARE OP)

## 2024-02-22 PROCEDURE — 96365 THER/PROPH/DIAG IV INF INIT: CPT

## 2024-02-22 PROCEDURE — 7100000001 HC RECOVERY ROOM TIME - INITIAL BASE CHARGE: Performed by: SURGERY

## 2024-02-22 PROCEDURE — 44970 LAPAROSCOPY APPENDECTOMY: CPT | Performed by: SURGERY

## 2024-02-22 PROCEDURE — 2500000002 HC RX 250 W HCPCS SELF ADMINISTERED DRUGS (ALT 637 FOR MEDICARE OP, ALT 636 FOR OP/ED)

## 2024-02-22 PROCEDURE — 3700000002 HC GENERAL ANESTHESIA TIME - EACH INCREMENTAL 1 MINUTE: Performed by: SURGERY

## 2024-02-22 PROCEDURE — 2720000007 HC OR 272 NO HCPCS: Performed by: SURGERY

## 2024-02-22 PROCEDURE — A44970 PR LAP,APPENDECTOMY: Performed by: ANESTHESIOLOGY

## 2024-02-22 RX ORDER — CHOLECALCIFEROL (VITAMIN D3) 25 MCG
2000 TABLET ORAL DAILY
Status: DISCONTINUED | OUTPATIENT
Start: 2024-02-22 | End: 2024-02-23 | Stop reason: HOSPADM

## 2024-02-22 RX ORDER — BUPIVACAINE HYDROCHLORIDE 5 MG/ML
INJECTION, SOLUTION PERINEURAL AS NEEDED
Status: DISCONTINUED | OUTPATIENT
Start: 2024-02-22 | End: 2024-02-22 | Stop reason: HOSPADM

## 2024-02-22 RX ORDER — ACETAMINOPHEN 325 MG/1
650 TABLET ORAL EVERY 4 HOURS PRN
Status: DISCONTINUED | OUTPATIENT
Start: 2024-02-22 | End: 2024-02-22 | Stop reason: HOSPADM

## 2024-02-22 RX ORDER — CEFAZOLIN 1 G/1
INJECTION, POWDER, FOR SOLUTION INTRAVENOUS AS NEEDED
Status: DISCONTINUED | OUTPATIENT
Start: 2024-02-22 | End: 2024-02-22

## 2024-02-22 RX ORDER — ACETAMINOPHEN 325 MG/1
650 TABLET ORAL EVERY 4 HOURS PRN
Status: DISCONTINUED | OUTPATIENT
Start: 2024-02-22 | End: 2024-02-22

## 2024-02-22 RX ORDER — ACETAMINOPHEN 160 MG/5ML
650 SOLUTION ORAL EVERY 4 HOURS PRN
Status: DISCONTINUED | OUTPATIENT
Start: 2024-02-22 | End: 2024-02-22

## 2024-02-22 RX ORDER — IPRATROPIUM BROMIDE 0.5 MG/2.5ML
500 SOLUTION RESPIRATORY (INHALATION) ONCE
Status: DISCONTINUED | OUTPATIENT
Start: 2024-02-22 | End: 2024-02-22 | Stop reason: HOSPADM

## 2024-02-22 RX ORDER — OXYCODONE HYDROCHLORIDE 5 MG/1
5 TABLET ORAL EVERY 4 HOURS PRN
Status: DISCONTINUED | OUTPATIENT
Start: 2024-02-22 | End: 2024-02-23 | Stop reason: HOSPADM

## 2024-02-22 RX ORDER — LIDOCAINE HYDROCHLORIDE 20 MG/ML
INJECTION, SOLUTION INFILTRATION; PERINEURAL AS NEEDED
Status: DISCONTINUED | OUTPATIENT
Start: 2024-02-22 | End: 2024-02-22

## 2024-02-22 RX ORDER — ACETAMINOPHEN 325 MG/1
975 TABLET ORAL EVERY 6 HOURS
Status: DISCONTINUED | OUTPATIENT
Start: 2024-02-22 | End: 2024-02-23 | Stop reason: HOSPADM

## 2024-02-22 RX ORDER — NABUMETONE 500 MG/1
500 TABLET, FILM COATED ORAL 2 TIMES DAILY
Status: DISCONTINUED | OUTPATIENT
Start: 2024-02-22 | End: 2024-02-22

## 2024-02-22 RX ORDER — TALC
3 POWDER (GRAM) TOPICAL NIGHTLY
Status: DISCONTINUED | OUTPATIENT
Start: 2024-02-22 | End: 2024-02-23 | Stop reason: HOSPADM

## 2024-02-22 RX ORDER — MORPHINE SULFATE 2 MG/ML
1 INJECTION, SOLUTION INTRAMUSCULAR; INTRAVENOUS EVERY 4 HOURS PRN
Status: DISCONTINUED | OUTPATIENT
Start: 2024-02-22 | End: 2024-02-22

## 2024-02-22 RX ORDER — PHENYLEPHRINE HCL IN 0.9% NACL 0.4MG/10ML
SYRINGE (ML) INTRAVENOUS AS NEEDED
Status: DISCONTINUED | OUTPATIENT
Start: 2024-02-22 | End: 2024-02-22

## 2024-02-22 RX ORDER — OXYCODONE HYDROCHLORIDE 5 MG/1
5 TABLET ORAL EVERY 4 HOURS PRN
Status: DISCONTINUED | OUTPATIENT
Start: 2024-02-22 | End: 2024-02-22 | Stop reason: HOSPADM

## 2024-02-22 RX ORDER — ONDANSETRON HYDROCHLORIDE 2 MG/ML
4 INJECTION, SOLUTION INTRAVENOUS EVERY 8 HOURS PRN
Status: DISCONTINUED | OUTPATIENT
Start: 2024-02-22 | End: 2024-02-23 | Stop reason: HOSPADM

## 2024-02-22 RX ORDER — LANOLIN ALCOHOL/MO/W.PET/CERES
1000 CREAM (GRAM) TOPICAL DAILY
Status: DISCONTINUED | OUTPATIENT
Start: 2024-02-22 | End: 2024-02-23 | Stop reason: HOSPADM

## 2024-02-22 RX ORDER — PROPOFOL 10 MG/ML
INJECTION, EMULSION INTRAVENOUS AS NEEDED
Status: DISCONTINUED | OUTPATIENT
Start: 2024-02-22 | End: 2024-02-22

## 2024-02-22 RX ORDER — ENOXAPARIN SODIUM 100 MG/ML
40 INJECTION SUBCUTANEOUS EVERY 24 HOURS
Status: DISCONTINUED | OUTPATIENT
Start: 2024-02-22 | End: 2024-02-23 | Stop reason: HOSPADM

## 2024-02-22 RX ORDER — SODIUM CHLORIDE 9 MG/ML
75 INJECTION, SOLUTION INTRAVENOUS CONTINUOUS
Status: DISCONTINUED | OUTPATIENT
Start: 2024-02-22 | End: 2024-02-23

## 2024-02-22 RX ORDER — SIMVASTATIN 20 MG/1
20 TABLET, FILM COATED ORAL NIGHTLY
Status: DISCONTINUED | OUTPATIENT
Start: 2024-02-22 | End: 2024-02-23 | Stop reason: HOSPADM

## 2024-02-22 RX ORDER — ROCURONIUM BROMIDE 10 MG/ML
INJECTION, SOLUTION INTRAVENOUS AS NEEDED
Status: DISCONTINUED | OUTPATIENT
Start: 2024-02-22 | End: 2024-02-22

## 2024-02-22 RX ORDER — SODIUM CHLORIDE, SODIUM LACTATE, POTASSIUM CHLORIDE, CALCIUM CHLORIDE 600; 310; 30; 20 MG/100ML; MG/100ML; MG/100ML; MG/100ML
100 INJECTION, SOLUTION INTRAVENOUS CONTINUOUS
Status: DISCONTINUED | OUTPATIENT
Start: 2024-02-22 | End: 2024-02-22 | Stop reason: HOSPADM

## 2024-02-22 RX ORDER — LIDOCAINE HYDROCHLORIDE 10 MG/ML
0.1 INJECTION, SOLUTION EPIDURAL; INFILTRATION; INTRACAUDAL; PERINEURAL ONCE
Status: DISCONTINUED | OUTPATIENT
Start: 2024-02-22 | End: 2024-02-22 | Stop reason: HOSPADM

## 2024-02-22 RX ORDER — DEXAMETHASONE SODIUM PHOSPHATE 4 MG/ML
INJECTION, SOLUTION INTRA-ARTICULAR; INTRALESIONAL; INTRAMUSCULAR; INTRAVENOUS; SOFT TISSUE AS NEEDED
Status: DISCONTINUED | OUTPATIENT
Start: 2024-02-22 | End: 2024-02-22

## 2024-02-22 RX ORDER — POLYETHYLENE GLYCOL 3350 17 G/17G
17 POWDER, FOR SOLUTION ORAL DAILY
Status: DISCONTINUED | OUTPATIENT
Start: 2024-02-22 | End: 2024-02-23 | Stop reason: HOSPADM

## 2024-02-22 RX ORDER — OXYCODONE HYDROCHLORIDE 5 MG/1
10 TABLET ORAL EVERY 4 HOURS PRN
Status: DISCONTINUED | OUTPATIENT
Start: 2024-02-22 | End: 2024-02-23 | Stop reason: HOSPADM

## 2024-02-22 RX ORDER — ACETAMINOPHEN 650 MG/1
650 SUPPOSITORY RECTAL EVERY 4 HOURS PRN
Status: DISCONTINUED | OUTPATIENT
Start: 2024-02-22 | End: 2024-02-22

## 2024-02-22 RX ORDER — FENTANYL CITRATE 50 UG/ML
INJECTION, SOLUTION INTRAMUSCULAR; INTRAVENOUS AS NEEDED
Status: DISCONTINUED | OUTPATIENT
Start: 2024-02-22 | End: 2024-02-22

## 2024-02-22 RX ORDER — ENOXAPARIN SODIUM 100 MG/ML
40 INJECTION SUBCUTANEOUS EVERY 24 HOURS
Status: DISCONTINUED | OUTPATIENT
Start: 2024-02-23 | End: 2024-02-22

## 2024-02-22 RX ORDER — ONDANSETRON HYDROCHLORIDE 2 MG/ML
4 INJECTION, SOLUTION INTRAVENOUS ONCE AS NEEDED
Status: DISCONTINUED | OUTPATIENT
Start: 2024-02-22 | End: 2024-02-22 | Stop reason: HOSPADM

## 2024-02-22 RX ORDER — ONDANSETRON HYDROCHLORIDE 2 MG/ML
INJECTION, SOLUTION INTRAVENOUS AS NEEDED
Status: DISCONTINUED | OUTPATIENT
Start: 2024-02-22 | End: 2024-02-22

## 2024-02-22 RX ORDER — MORPHINE SULFATE 4 MG/ML
4 INJECTION, SOLUTION INTRAMUSCULAR; INTRAVENOUS ONCE
Status: COMPLETED | OUTPATIENT
Start: 2024-02-22 | End: 2024-02-22

## 2024-02-22 RX ORDER — MIDAZOLAM HYDROCHLORIDE 1 MG/ML
INJECTION INTRAMUSCULAR; INTRAVENOUS AS NEEDED
Status: DISCONTINUED | OUTPATIENT
Start: 2024-02-22 | End: 2024-02-22

## 2024-02-22 RX ORDER — ONDANSETRON 4 MG/1
4 TABLET, FILM COATED ORAL EVERY 8 HOURS PRN
Status: DISCONTINUED | OUTPATIENT
Start: 2024-02-22 | End: 2024-02-23 | Stop reason: HOSPADM

## 2024-02-22 RX ORDER — ALBUTEROL SULFATE 0.83 MG/ML
2.5 SOLUTION RESPIRATORY (INHALATION) ONCE AS NEEDED
Status: DISCONTINUED | OUTPATIENT
Start: 2024-02-22 | End: 2024-02-22 | Stop reason: HOSPADM

## 2024-02-22 RX ADMIN — FENTANYL CITRATE 50 MCG: 50 INJECTION, SOLUTION INTRAMUSCULAR; INTRAVENOUS at 18:26

## 2024-02-22 RX ADMIN — SODIUM CHLORIDE 100 ML/HR: 9 INJECTION, SOLUTION INTRAVENOUS at 16:18

## 2024-02-22 RX ADMIN — SODIUM CHLORIDE, SODIUM LACTATE, POTASSIUM CHLORIDE, AND CALCIUM CHLORIDE: 600; 310; 30; 20 INJECTION, SOLUTION INTRAVENOUS at 18:15

## 2024-02-22 RX ADMIN — CEFAZOLIN 2 G: 330 INJECTION, POWDER, FOR SOLUTION INTRAMUSCULAR; INTRAVENOUS at 18:33

## 2024-02-22 RX ADMIN — SUGAMMADEX 200 MG: 100 INJECTION, SOLUTION INTRAVENOUS at 19:09

## 2024-02-22 RX ADMIN — Medication 250 MCG: at 18:36

## 2024-02-22 RX ADMIN — Medication 150 MCG: at 18:33

## 2024-02-22 RX ADMIN — Medication 200 MCG: at 19:06

## 2024-02-22 RX ADMIN — ROCURONIUM BROMIDE 50 MG: 10 INJECTION, SOLUTION INTRAVENOUS at 18:27

## 2024-02-22 RX ADMIN — SODIUM CHLORIDE, POTASSIUM CHLORIDE, SODIUM LACTATE AND CALCIUM CHLORIDE 100 ML/HR: 600; 310; 30; 20 INJECTION, SOLUTION INTRAVENOUS at 19:27

## 2024-02-22 RX ADMIN — MORPHINE SULFATE 4 MG: 4 INJECTION, SOLUTION INTRAMUSCULAR; INTRAVENOUS at 09:07

## 2024-02-22 RX ADMIN — SODIUM CHLORIDE 1000 ML: 9 INJECTION, SOLUTION INTRAVENOUS at 09:07

## 2024-02-22 RX ADMIN — FENTANYL CITRATE 50 MCG: 50 INJECTION, SOLUTION INTRAMUSCULAR; INTRAVENOUS at 18:48

## 2024-02-22 RX ADMIN — SIMVASTATIN 20 MG: 20 TABLET, FILM COATED ORAL at 20:58

## 2024-02-22 RX ADMIN — ONDANSETRON 4 MG: 2 INJECTION INTRAMUSCULAR; INTRAVENOUS at 19:05

## 2024-02-22 RX ADMIN — Medication 3 MG: at 20:59

## 2024-02-22 RX ADMIN — DEXAMETHASONE SODIUM PHOSPHATE 4 MG: 4 INJECTION, SOLUTION INTRAMUSCULAR; INTRAVENOUS at 18:36

## 2024-02-22 RX ADMIN — ACETAMINOPHEN 975 MG: 325 TABLET ORAL at 20:58

## 2024-02-22 RX ADMIN — LIDOCAINE HYDROCHLORIDE 100 MG: 20 INJECTION, SOLUTION INFILTRATION; PERINEURAL at 18:26

## 2024-02-22 RX ADMIN — MIDAZOLAM HYDROCHLORIDE 2 MG: 1 INJECTION, SOLUTION INTRAMUSCULAR; INTRAVENOUS at 18:20

## 2024-02-22 RX ADMIN — Medication 250 MCG: at 18:42

## 2024-02-22 RX ADMIN — PIPERACILLIN SODIUM AND TAZOBACTAM SODIUM 4.5 G: 4; .5 INJECTION, SOLUTION INTRAVENOUS at 14:19

## 2024-02-22 RX ADMIN — IOHEXOL 75 ML: 350 INJECTION, SOLUTION INTRAVENOUS at 13:30

## 2024-02-22 RX ADMIN — PROPOFOL 200 MG: 10 INJECTION, EMULSION INTRAVENOUS at 18:26

## 2024-02-22 SDOH — SOCIAL STABILITY: SOCIAL INSECURITY: HAS ANYONE EVER THREATENED TO HURT YOUR FAMILY OR YOUR PETS?: NO

## 2024-02-22 SDOH — SOCIAL STABILITY: SOCIAL INSECURITY: WERE YOU ABLE TO COMPLETE ALL THE BEHAVIORAL HEALTH SCREENINGS?: YES

## 2024-02-22 SDOH — SOCIAL STABILITY: SOCIAL INSECURITY: DO YOU FEEL UNSAFE GOING BACK TO THE PLACE WHERE YOU ARE LIVING?: NO

## 2024-02-22 SDOH — SOCIAL STABILITY: SOCIAL INSECURITY: DOES ANYONE TRY TO KEEP YOU FROM HAVING/CONTACTING OTHER FRIENDS OR DOING THINGS OUTSIDE YOUR HOME?: NO

## 2024-02-22 SDOH — SOCIAL STABILITY: SOCIAL INSECURITY: HAVE YOU HAD THOUGHTS OF HARMING ANYONE ELSE?: NO

## 2024-02-22 SDOH — SOCIAL STABILITY: SOCIAL INSECURITY: ARE YOU OR HAVE YOU BEEN THREATENED OR ABUSED PHYSICALLY, EMOTIONALLY, OR SEXUALLY BY ANYONE?: NO

## 2024-02-22 SDOH — SOCIAL STABILITY: SOCIAL INSECURITY: DO YOU FEEL ANYONE HAS EXPLOITED OR TAKEN ADVANTAGE OF YOU FINANCIALLY OR OF YOUR PERSONAL PROPERTY?: NO

## 2024-02-22 SDOH — SOCIAL STABILITY: SOCIAL INSECURITY: ARE THERE ANY APPARENT SIGNS OF INJURIES/BEHAVIORS THAT COULD BE RELATED TO ABUSE/NEGLECT?: NO

## 2024-02-22 SDOH — SOCIAL STABILITY: SOCIAL INSECURITY: ABUSE: ADULT

## 2024-02-22 ASSESSMENT — COGNITIVE AND FUNCTIONAL STATUS - GENERAL
CLIMB 3 TO 5 STEPS WITH RAILING: A LITTLE
DAILY ACTIVITIY SCORE: 21
DRESSING REGULAR LOWER BODY CLOTHING: A LITTLE
DAILY ACTIVITIY SCORE: 24
MOVING FROM LYING ON BACK TO SITTING ON SIDE OF FLAT BED WITH BEDRAILS: A LITTLE
PATIENT BASELINE BEDBOUND: NO
CLIMB 3 TO 5 STEPS WITH RAILING: A LITTLE
STANDING UP FROM CHAIR USING ARMS: A LITTLE
WALKING IN HOSPITAL ROOM: A LITTLE
MOBILITY SCORE: 22
MOBILITY SCORE: 18
MOVING TO AND FROM BED TO CHAIR: A LITTLE
TURNING FROM BACK TO SIDE WHILE IN FLAT BAD: A LITTLE
TOILETING: A LITTLE
MOVING TO AND FROM BED TO CHAIR: A LITTLE
DRESSING REGULAR UPPER BODY CLOTHING: A LITTLE

## 2024-02-22 ASSESSMENT — PAIN - FUNCTIONAL ASSESSMENT
PAIN_FUNCTIONAL_ASSESSMENT: 0-10

## 2024-02-22 ASSESSMENT — PATIENT HEALTH QUESTIONNAIRE - PHQ9
SUM OF ALL RESPONSES TO PHQ9 QUESTIONS 1 & 2: 0
1. LITTLE INTEREST OR PLEASURE IN DOING THINGS: NOT AT ALL
2. FEELING DOWN, DEPRESSED OR HOPELESS: NOT AT ALL

## 2024-02-22 ASSESSMENT — ACTIVITIES OF DAILY LIVING (ADL)
ADEQUATE_TO_COMPLETE_ADL: YES
BATHING: NEEDS ASSISTANCE
JUDGMENT_ADEQUATE_SAFELY_COMPLETE_DAILY_ACTIVITIES: YES
TOILETING: NEEDS ASSISTANCE
LACK_OF_TRANSPORTATION: NO
FEEDING YOURSELF: INDEPENDENT
DRESSING YOURSELF: NEEDS ASSISTANCE
HEARING - RIGHT EAR: FUNCTIONAL
PATIENT'S MEMORY ADEQUATE TO SAFELY COMPLETE DAILY ACTIVITIES?: YES
HEARING - LEFT EAR: FUNCTIONAL
GROOMING: INDEPENDENT
WALKS IN HOME: NEEDS ASSISTANCE

## 2024-02-22 ASSESSMENT — LIFESTYLE VARIABLES
HOW MANY STANDARD DRINKS CONTAINING ALCOHOL DO YOU HAVE ON A TYPICAL DAY: PATIENT DOES NOT DRINK
HOW OFTEN DO YOU HAVE A DRINK CONTAINING ALCOHOL: NEVER
HOW OFTEN DO YOU HAVE 6 OR MORE DRINKS ON ONE OCCASION: NEVER
AUDIT-C TOTAL SCORE: 0
SKIP TO QUESTIONS 9-10: 1
AUDIT-C TOTAL SCORE: 0

## 2024-02-22 ASSESSMENT — PAIN DESCRIPTION - DESCRIPTORS: DESCRIPTORS: DULL

## 2024-02-22 ASSESSMENT — PAIN SCALES - GENERAL
PAINLEVEL_OUTOF10: 2
PAINLEVEL_OUTOF10: 4
PAINLEVEL_OUTOF10: 2
PAINLEVEL_OUTOF10: 3

## 2024-02-22 ASSESSMENT — PAIN DESCRIPTION - ORIENTATION: ORIENTATION: RIGHT

## 2024-02-22 ASSESSMENT — PAIN DESCRIPTION - LOCATION: LOCATION: ABDOMEN

## 2024-02-22 ASSESSMENT — PAIN DESCRIPTION - ONSET: ONSET: AWAKENED FROM SLEEP

## 2024-02-22 ASSESSMENT — PAIN DESCRIPTION - PROGRESSION: CLINICAL_PROGRESSION: NOT CHANGED

## 2024-02-22 ASSESSMENT — PAIN DESCRIPTION - PAIN TYPE: TYPE: ACUTE PAIN

## 2024-02-22 ASSESSMENT — PAIN DESCRIPTION - FREQUENCY: FREQUENCY: CONSTANT/CONTINUOUS

## 2024-02-22 NOTE — PROGRESS NOTES
Pharmacy Medication History Review    Van Hayes is a 63 y.o. male admitted for Appendicitis. Pharmacy reviewed the patient's [id number] medications and allergies for accuracy.    The list below reflectives the updated PTA list. Please review each medication in order reconciliation for additional clarification and justification.     Prior to Admission Medications   Prescriptions Last Dose Informant               cholecalciferol (Vitamin D-3) 50 MCG (2000 UT) tablet 2/21/2024 at pm      Sig: Take 1 tablet (50 mcg) by mouth once daily. At Night             cyanocobalamin (Vitamin B-12) 1,000 mcg tablet 2/21/2024 at pm      Sig: Take 1 tablet (1,000 mcg) by mouth once daily. At night                irbesartan (Avapro) 150 mg tablet 2/21/2024 at pm      Sig: Take 1 tablet (150 mg) by mouth once daily.   Patient taking differently: Take 1 tablet (150 mg) by mouth once daily. At Bedtime                                                                                  simvastatin (Zocor) 20 mg tablet 2/21/2024 at pm  No No   Sig: Take 1 tablet (20 mg) by mouth once daily at bedtime.      Facility-Administered Medications: None      Spoke to the patient and spouse patient only takes 2 Rx meds and 2 vitamins    The list below reflectives the updated allergy list. Please review each documented allergy for additional clarification and justification.  Allergies  Reviewed by SILVA Londono on 2/22/2024   No Known Allergies         Below are additional concerns with the patient's PTA list.      Luz Godoy CPhT

## 2024-02-22 NOTE — NURSING NOTE
To pacu via bed. Wife at bedside. Pt. States everything is removed from his person except gown and socks.

## 2024-02-22 NOTE — CONSULTS
Reason For Consult  Appendicitis    History Of Present Illness  Van Hayes is a 63 y.o. male presenting with RLQ pain that started at around 8pm last night. He reports pain was steady and increased throughout the night, he was unable to get much rest. He is tender in RLQ and reports pain worse when moving. CT scan showed appendicitis. WBC was increased to 12s. Last thing to drink was black coffee this morning at 8am. Last thing to eat was last night around 8pm. Denies any blood thinners.     Past Medical History  He has a past medical history of Hyperlipidemia, Hypertension, Melanoma (CMS/HCC), and Personal history of other endocrine, nutritional and metabolic disease (06/01/2021).    Surgical History  He has a past surgical history that includes Colonoscopy and Other surgical history.     Social History  He reports that he has never smoked. He has never used smokeless tobacco. He reports current alcohol use. He reports that he does not use drugs.    Family History  Family History   Problem Relation Name Age of Onset    Heart attack Father      Other (Abdominal aortic artery aneurysm (__cm)) Father      Other (Bile duct cancer) Father      Colon cancer Father      Coronary artery disease Father      Prostate cancer Father      Other (S/P CABG) Father          Allergies  Patient has no known allergies.    Review of Systems  A full 10 point ROS was completed. Nothing positive other than what was mentioned in the HPI.      Physical Exam  PE:  Constitutional: A&Ox3, calm and cooperative    Head/Neck: Neck supple, no JVD    Cardiovascular: RRR    Respiratory/Thorax: Non labored breathing on RA    Gastrointestinal: Abdomen nondistended, soft, Tender in RLQ    Genitourinary: Voiding independently     Musculoskeletal: ROM intact, no joint swelling, normal strength    Extremities: ENRIQUEZ, No peripheral edema    Neurological: No focal deficits     Psychological: Appropriate mood and behavior    Skin: Warm and dry.        Last Recorded Vitals  Blood pressure 132/71, pulse 76, temperature 37 °C (98.6 °F), temperature source Oral, resp. rate 20, height 1.829 m (6'), weight 109 kg (240 lb), SpO2 95 %.    Relevant Results  Results for orders placed or performed during the hospital encounter of 02/22/24 (from the past 24 hour(s))   Lactate   Result Value Ref Range    Lactate 0.9 0.4 - 2.0 mmol/L   Lipase   Result Value Ref Range    Lipase 18 9 - 82 U/L   Comprehensive Metabolic Panel   Result Value Ref Range    Glucose 121 (H) 74 - 99 mg/dL    Sodium 135 (L) 136 - 145 mmol/L    Potassium 4.1 3.5 - 5.3 mmol/L    Chloride 103 98 - 107 mmol/L    Bicarbonate 24 21 - 32 mmol/L    Anion Gap 12 10 - 20 mmol/L    Urea Nitrogen 16 6 - 23 mg/dL    Creatinine 0.99 0.50 - 1.30 mg/dL    eGFR 86 >60 mL/min/1.73m*2    Calcium 8.8 8.6 - 10.3 mg/dL    Albumin 4.4 3.4 - 5.0 g/dL    Alkaline Phosphatase 78 33 - 136 U/L    Total Protein 7.1 6.4 - 8.2 g/dL    AST 17 9 - 39 U/L    Bilirubin, Total 1.1 0.0 - 1.2 mg/dL    ALT 20 10 - 52 U/L   CBC and Auto Differential   Result Value Ref Range    WBC 12.5 (H) 4.4 - 11.3 x10*3/uL    nRBC 0.0 0.0 - 0.0 /100 WBCs    RBC 5.04 4.50 - 5.90 x10*6/uL    Hemoglobin 15.5 13.5 - 17.5 g/dL    Hematocrit 45.6 41.0 - 52.0 %    MCV 91 80 - 100 fL    MCH 30.8 26.0 - 34.0 pg    MCHC 34.0 32.0 - 36.0 g/dL    RDW 12.1 11.5 - 14.5 %    Platelets 177 150 - 450 x10*3/uL    Neutrophils % 83.1 40.0 - 80.0 %    Immature Granulocytes %, Automated 0.4 0.0 - 0.9 %    Lymphocytes % 7.3 13.0 - 44.0 %    Monocytes % 6.7 2.0 - 10.0 %    Eosinophils % 2.2 0.0 - 6.0 %    Basophils % 0.3 0.0 - 2.0 %    Neutrophils Absolute 10.42 (H) 1.20 - 7.70 x10*3/uL    Immature Granulocytes Absolute, Automated 0.05 0.00 - 0.70 x10*3/uL    Lymphocytes Absolute 0.91 (L) 1.20 - 4.80 x10*3/uL    Monocytes Absolute 0.84 0.10 - 1.00 x10*3/uL    Eosinophils Absolute 0.27 0.00 - 0.70 x10*3/uL    Basophils Absolute 0.04 0.00 - 0.10 x10*3/uL   Urinalysis with Reflex  Culture and Microscopic   Result Value Ref Range    Color, Urine Yellow Straw, Yellow    Appearance, Urine Clear Clear    Specific Gravity, Urine 1.027 1.005 - 1.035    pH, Urine 5.0 5.0, 5.5, 6.0, 6.5, 7.0, 7.5, 8.0    Protein, Urine NEGATIVE NEGATIVE mg/dL    Glucose, Urine NEGATIVE NEGATIVE mg/dL    Blood, Urine NEGATIVE NEGATIVE    Ketones, Urine NEGATIVE NEGATIVE mg/dL    Bilirubin, Urine NEGATIVE NEGATIVE    Urobilinogen, Urine 2.0 (N) <2.0 mg/dL    Nitrite, Urine NEGATIVE NEGATIVE    Leukocyte Esterase, Urine NEGATIVE NEGATIVE     CT abdomen pelvis w IV contrast   Final Result   The above findings are compatible with appendicitis. There is a 7 mm   appendicolith in the proximal to mid appendiceal lumen. There is no   abscess or pneumoperitoneum.        Fat containing umbilical and inguinal hernias. There is no herniated   bowel.        12 mm right hepatic cyst and several too small to characterize   hepatic hypodensities.        The remainder of the abdomen and pelvis is unremarkable.        Signed by: Florentino Espinal 2/22/2024 1:56 PM   Dictation workstation:   [id number]            Assessment/Plan     Plan: Appendicitis  - Admit obs  - NPO  - Added on to OR schedule with Dr. Ardon for [facility] appy today.  - PRN pain control   - PRN antiemetic    Dispo: Will plan on OR today     I spent 60 minutes in the professional and overall care of this patient.      Miguel Alvarez PA-C

## 2024-02-22 NOTE — ANESTHESIA PROCEDURE NOTES
Airway  Date/Time: 2/22/2024 6:29 PM  Urgency: elective    Airway not difficult    Staffing  Performed: ADONIS   Authorized by: Gurdeep Venegas MD    Performed by: ADONIS Medrano  Patient location during procedure: OR    Indications and Patient Condition  Indications for airway management: anesthesia  Spontaneous ventilation: present  Sedation level: deep  Preoxygenated: yes  Patient position: sniffing  Mask difficulty assessment: 1 - vent by mask    Final Airway Details  Final airway type: endotracheal airway      Successful airway: ETT  Cuffed: yes   Successful intubation technique: direct laryngoscopy  Facilitating devices/methods: intubating stylet  Endotracheal tube insertion site: oral  Blade: Lionel  Blade size: #3  ETT size (mm): 7.5  Cormack-Lehane Classification: grade I - full view of glottis  Placement verified by: chest auscultation and capnometry   Measured from: teeth  ETT to teeth (cm): 23  Number of attempts at approach: 1

## 2024-02-22 NOTE — ED TRIAGE NOTES
Pt presents to the ED for a complaint of acute onset RLQ pain starting last night. Pt presents aox4 (gcs 15), has a patent airway (no cyanosis around lips, able to speak in full and complete sentences), and does not appear in distress.  Pt reports last night (2/21) after laying down for bed he developed a dull pain in the RLQ. Palpation revels tenderness in RLQ and LUQ.

## 2024-02-22 NOTE — ED PROVIDER NOTES
HPI   Chief Complaint   Patient presents with    Abdominal Pain       HPI  HISTORY OF PRESENT ILLNESS:  63 y.o. male presenting to the ED with complaint of abdominal pain that began last night.  He states that last night after laying down for bed he developed dull pain in the right lower quadrant, he states it was relatively sudden onset.  He states that he was able to fall asleep, but the pain persisted throughout the night, and he had a restless night.  This morning he took 2 ibuprofen which did not help alleviate the pain.  Pain does not radiate, no pain in the back or flanks, no pain in the chest.  No pain in the testicles, no testicular swelling or tenderness.  Denies other associated symptoms.  No nausea or vomiting, no constipation or diarrhea.  No melena or hematochezia.  Denies any urinary symptoms, no dysuria, hematuria, urinary urgency or frequency.  Denies fevers or chills.  No chest pain or shortness of breath.  He denies ever having similar symptoms in the past.  Denies any prior abdominal surgeries.  No other complaints or symptoms voiced.    12 point review of systems was performed and is negative unless otherwise specified in HPI.    PMH: HTN, HLD  Family history: noncontributory  Social history: non smoker, no ETOH, no illicit substances  Past Medical History:   Diagnosis Date    Hyperlipidemia     Hypertension     Melanoma (CMS/HCC)     Personal history of other endocrine, nutritional and metabolic disease 06/01/2021    History of familial combined hyperlipidemia         Labs Reviewed   LACTATE   LIPASE   COMPREHENSIVE METABOLIC PANEL   CBC WITH AUTO DIFFERENTIAL   URINALYSIS WITH REFLEX CULTURE AND MICROSCOPIC    Narrative:     The following orders were created for panel order Urinalysis with Reflex Culture and Microscopic.  Procedure                               Abnormality         Status                     ---------                               -----------         ------                      Urinalysis with Reflex C...[[id number]]                                                 Extra Urine Gray Tube[[id number]]                                                         Please view results for these tests on the individual orders.   URINALYSIS WITH REFLEX CULTURE AND MICROSCOPIC   EXTRA URINE GRAY TUBE     CT abdomen pelvis w IV contrast    (Results Pending)              [place] Coma Scale Score: 15                     Patient History   Past Medical History:   Diagnosis Date    Hyperlipidemia     Hypertension     Melanoma (CMS/HCC)     Personal history of other endocrine, nutritional and metabolic disease 06/01/2021    History of familial combined hyperlipidemia     Past Surgical History:   Procedure Laterality Date    COLONOSCOPY      OTHER SURGICAL HISTORY      melanoma resected from left lower leg     Family History   Problem Relation Name Age of Onset    Heart attack Father      Other (Abdominal aortic artery aneurysm (__cm)) Father      Other (Bile duct cancer) Father      Colon cancer Father      Coronary artery disease Father      Prostate cancer Father      Other (S/P CABG) Father       Social History     Tobacco Use    Smoking status: Never    Smokeless tobacco: Never    Tobacco comments:     Denies SOB with activity/stairs/lying flat. Ambulates freely. No illness last 30 days. Denies any personal or family member reaction to anesthesia.    Vaping Use    Vaping Use: Never used   Substance Use Topics    Alcohol use: Yes     Comment: drinks McNairy 3-4 times a week.    Drug use: Never       Physical Exam   ED Triage Vitals [02/22/24 0839]   Temperature Heart Rate Respirations BP   37 °C (98.6 °F) 93 20 152/88      Pulse Ox Temp Source Heart Rate Source Patient Position   98 % Oral Monitor Sitting      BP Location FiO2 (%)     Right arm --       Physical Exam  Constitutional:       General: He is not in acute distress.     Appearance: He is not ill-appearing, toxic-appearing or diaphoretic.   HENT:       Head: Normocephalic and atraumatic.      Nose: No congestion.      Mouth/Throat:      Mouth: Mucous membranes are moist.   Eyes:      General: No scleral icterus.     Extraocular Movements: Extraocular movements intact.   Cardiovascular:      Rate and Rhythm: Normal rate and regular rhythm.      Pulses: Normal pulses.   Pulmonary:      Effort: Pulmonary effort is normal. No respiratory distress.      Breath sounds: Normal breath sounds.   Abdominal:      General: There is no distension.      Palpations: Abdomen is soft. There is no shifting dullness, mass or pulsatile mass.      Tenderness: There is abdominal tenderness in the right lower quadrant. There is no right CVA tenderness, left CVA tenderness or guarding. Positive signs include McBurney's sign.   Musculoskeletal:         General: Normal range of motion.      Cervical back: Normal range of motion and neck supple. No rigidity.      Right lower leg: No edema.      Left lower leg: No edema.   Skin:     General: Skin is warm and dry.      Capillary Refill: Capillary refill takes less than 2 seconds.   Neurological:      General: No focal deficit present.      Mental Status: He is alert and oriented to person, place, and time.      Gait: Gait normal.   Psychiatric:         Mood and Affect: Mood normal.         Behavior: Behavior normal.         Judgment: Judgment normal.     ED Course & MDM   Diagnoses as of 02/22/24 1647   Acute appendicitis, unspecified acute appendicitis type       Medical Decision Making  ED course / MDM     Summary:  Patient presented with RLQ abdominal pain that began last night. Vitals stable, patient is well appearing. Tenderness to palpation in the RLQ, no guarding or rigidity. No CVA tenderness. IV established, labs drawn. Given morphine and IV fluids, which did improve his pain.   Labs are notable for a leukocytosis of 12.5, glucose 121 and sodium 135, no other lab abnormalities.   CT scan shows acute appendicitis with appendicolith,  as well as incidental findings which were discussed with patient.  Surgery team was consulted, who recommends admission to medicine.  Given a dose of IV Zosyn.  Surgery team plans to take the patient to the OR today.  Patient case discussed with ED attending Dr. Olsen, who also saw and evaluated the patient. Results and differential were discussed in detail with the patient.  He is in agreement with the plan for admission. Accepted to observation unit.    Impression:  1. See diagnosis     Disposition: Admission    Patient seen and discussed with Dr. Olsen    This note has been transcribed using voice recognition and may contain grammatical errors, misplaced words, incorrect words, incorrect phrases or other errors.   Procedure  Procedures     Emy Burroughs PA-C  02/22/24 [id number]

## 2024-02-22 NOTE — ANESTHESIA PREPROCEDURE EVALUATION
Patient: Van Hayes    Procedure Information       Date/Time: 02/22/24 1800    Procedure: Appendectomy Laparoscopy    Location: [facility] OR 04 / [facility]    Surgeons: Miguel Ardon MD            Relevant Problems   Cardiovascular   (+) Benign essential HTN   (+) Hyperlipidemia      Endocrine   (+) Obesity      Musculoskeletal   (+) Right knee DJD      Infectious Disease   (+) Tinea pedis       Clinical information reviewed:    Allergies  Meds               NPO Detail:  No data recorded     Physical Exam    Airway  Mallampati: II     Cardiovascular   Rhythm: regular  Rate: normal     Dental    Pulmonary   Breath sounds clear to auscultation     Abdominal            Anesthesia Plan    History of general anesthesia?: yes  History of complications of general anesthesia?: no    ASA 3 - emergent     Anesthetic plan and risks discussed with patient.    Plan discussed with CRNA and CAA.

## 2024-02-22 NOTE — H&P
History Of Present Illness  Van Hayes is a 63 y.o. male presenting with RUQ pain that started last night while eating pizza for dinner. Due to persisting pain, he presented to the ED. Denies any fever, chills, nausea    PMHX: HTN/ HLD        Past Medical History  Past Medical History:   Diagnosis Date    Hyperlipidemia     Hypertension     Melanoma (CMS/HCC)     Personal history of other endocrine, nutritional and metabolic disease 06/01/2021    History of familial combined hyperlipidemia       Surgical History  Past Surgical History:   Procedure Laterality Date    COLONOSCOPY      OTHER SURGICAL HISTORY      melanoma resected from left lower leg        Social History  He reports that he has never smoked. He has never used smokeless tobacco. He reports current alcohol use. He reports that he does not use drugs.    Family History  Family History   Problem Relation Name Age of Onset    Heart attack Father      Other (Abdominal aortic artery aneurysm (__cm)) Father      Other (Bile duct cancer) Father      Colon cancer Father      Coronary artery disease Father      Prostate cancer Father      Other (S/P CABG) Father          Allergies  Patient has no known allergies.    Review of Systems     Physical Exam     Last Recorded Vitals  Blood pressure 132/71, pulse 76, temperature 37 °C (98.6 °F), temperature source Oral, resp. rate 20, height 1.829 m (6'), weight 109 kg (240 lb), SpO2 95 %.    Relevant Results  Scheduled medications  cholecalciferol, 2,000 Units, oral, Daily  cyanocobalamin, 1,000 mcg, oral, Daily  [START ON 2/23/2024] enoxaparin, 40 mg, subcutaneous, q24h  melatonin, 3 mg, oral, Nightly  nabumetone, 500 mg, oral, BID  piperacillin-tazobactam, 3.375 g, intravenous, q6h  polyethylene glycol, 17 g, oral, Daily  simvastatin, 20 mg, oral, Nightly      Continuous medications  sodium chloride 0.9%, 100 mL/hr      PRN medications  PRN medications: acetaminophen **OR** acetaminophen **OR** acetaminophen,  morphine, ondansetron **OR** ondansetron    Results for orders placed or performed during the hospital encounter of 02/22/24 (from the past 24 hour(s))   Lactate   Result Value Ref Range    Lactate 0.9 0.4 - 2.0 mmol/L   Lipase   Result Value Ref Range    Lipase 18 9 - 82 U/L   Comprehensive Metabolic Panel   Result Value Ref Range    Glucose 121 (H) 74 - 99 mg/dL    Sodium 135 (L) 136 - 145 mmol/L    Potassium 4.1 3.5 - 5.3 mmol/L    Chloride 103 98 - 107 mmol/L    Bicarbonate 24 21 - 32 mmol/L    Anion Gap 12 10 - 20 mmol/L    Urea Nitrogen 16 6 - 23 mg/dL    Creatinine 0.99 0.50 - 1.30 mg/dL    eGFR 86 >60 mL/min/1.73m*2    Calcium 8.8 8.6 - 10.3 mg/dL    Albumin 4.4 3.4 - 5.0 g/dL    Alkaline Phosphatase 78 33 - 136 U/L    Total Protein 7.1 6.4 - 8.2 g/dL    AST 17 9 - 39 U/L    Bilirubin, Total 1.1 0.0 - 1.2 mg/dL    ALT 20 10 - 52 U/L   CBC and Auto Differential   Result Value Ref Range    WBC 12.5 (H) 4.4 - 11.3 x10*3/uL    nRBC 0.0 0.0 - 0.0 /100 WBCs    RBC 5.04 4.50 - 5.90 x10*6/uL    Hemoglobin 15.5 13.5 - 17.5 g/dL    Hematocrit 45.6 41.0 - 52.0 %    MCV 91 80 - 100 fL    MCH 30.8 26.0 - 34.0 pg    MCHC 34.0 32.0 - 36.0 g/dL    RDW 12.1 11.5 - 14.5 %    Platelets 177 150 - 450 x10*3/uL    Neutrophils % 83.1 40.0 - 80.0 %    Immature Granulocytes %, Automated 0.4 0.0 - 0.9 %    Lymphocytes % 7.3 13.0 - 44.0 %    Monocytes % 6.7 2.0 - 10.0 %    Eosinophils % 2.2 0.0 - 6.0 %    Basophils % 0.3 0.0 - 2.0 %    Neutrophils Absolute 10.42 (H) 1.20 - 7.70 x10*3/uL    Immature Granulocytes Absolute, Automated 0.05 0.00 - 0.70 x10*3/uL    Lymphocytes Absolute 0.91 (L) 1.20 - 4.80 x10*3/uL    Monocytes Absolute 0.84 0.10 - 1.00 x10*3/uL    Eosinophils Absolute 0.27 0.00 - 0.70 x10*3/uL    Basophils Absolute 0.04 0.00 - 0.10 x10*3/uL   Urinalysis with Reflex Culture and Microscopic   Result Value Ref Range    Color, Urine Yellow Straw, Yellow    Appearance, Urine Clear Clear    Specific Gravity, Urine 1.027 1.005 -  1.035    pH, Urine 5.0 5.0, 5.5, 6.0, 6.5, 7.0, 7.5, 8.0    Protein, Urine NEGATIVE NEGATIVE mg/dL    Glucose, Urine NEGATIVE NEGATIVE mg/dL    Blood, Urine NEGATIVE NEGATIVE    Ketones, Urine NEGATIVE NEGATIVE mg/dL    Bilirubin, Urine NEGATIVE NEGATIVE    Urobilinogen, Urine 2.0 (N) <2.0 mg/dL    Nitrite, Urine NEGATIVE NEGATIVE    Leukocyte Esterase, Urine NEGATIVE NEGATIVE     CT abdomen pelvis w IV contrast    Result Date: 2/22/2024  Interpreted By:  Florentino Espinal, STUDY: CT ABDOMEN PELVIS W IV CONTRAST;  2/22/2024 1:35 pm   INDICATION: Signs/Symptoms:right lower quadrant abdominal pain.   COMPARISON: None.   ACCESSION NUMBER(S): [id number]   ORDERING CLINICIAN: MICHAEL WOMACK   TECHNIQUE: Contiguous axial CT sections are performed from the lung bases to the lesser trochanters following the uneventful administration of 75 cc of intravenous Omnipaque 350. The study is supplemented with coronal and sagittal reformatted images.   FINDINGS: There is linear atelectasis in the inferior lingula. Lung bases are otherwise clear.   There are mild multilevel discogenic degenerative changes of the thoracolumbar spine. The osseous structures are intact. There are mild to moderate osteoarthritic changes of both hips at the superior joint space with cam deformity of the proximal femurs. There are mild osteoarthritic changes at the sacroiliac joints. The sacroiliac joints remain patent and symmetric.   There are multiple scratch at there is a 12 x 10 mm round hypodensity in the right hepatic lobe posteriorly which is of simple fluid attenuation. There are several additional subcentimeter, too small to characterize, liver hypodensities. There is no perihepatic fluid collection. There is no intrahepatic or extrahepatic bile duct dilatation.   The gallbladder, spleen, pancreas, and adrenal glands are of normal CT appearance.   The kidneys are symmetric in size. There is no sign of renal calculus or hydronephrosis  bilaterally. The perinephric fat is preserved. There is a too small to characterize hypodensity in the mid to lower right kidney centrally suspicious for 1 cm cyst. There is no space-occupying lesion on the left. There is no hydroureter or obstructing ureteral calculus. The urinary bladder is not opacified though is well distended and otherwise unremarkable. There is no bladder calculus or wall thickening.   Coarse calcifications are identified centrally in the prostate. The prostate is of normal size.   There are scattered atherosclerotic arterial calcifications in the abdominal aorta. The aorta is of normal caliber. There is no periaortic mass or fluid collection. The IVC is unremarkable. There is no pathologic lymph node enlargement in the retroperitoneum are along the iliac chains.   There is inflammatory infiltration in the right lower quadrant surrounding the appendix. The appendix is fluid filled with a 7 mm appendicolith in the proximal to mid appendix. Distally the appendix reaches 1 cm in diameter. There is some secondary wall prominence of the distal ileum. There is no organized fluid collection or pneumoperitoneum.   There is fat containing bilateral inguinal hernias. There is no herniated bowel. There is a small fat containing umbilical hernia.         The above findings are compatible with appendicitis. There is a 7 mm appendicolith in the proximal to mid appendiceal lumen. There is no abscess or pneumoperitoneum.   Fat containing umbilical and inguinal hernias. There is no herniated bowel.   12 mm right hepatic cyst and several too small to characterize hepatic hypodensities.   The remainder of the abdomen and pelvis is unremarkable.   Signed by: Florentino Espinal 2/22/2024 1:56 PM Dictation workstation:   [id number]        Assessment/Plan   Principal Problem:    Appendicitis    Van Hayes is a 63 y.o. male presenting with RUQ pain that started last night while eating pizza for dinner. Due  to persisting pain, he presented to the ED. Denies any fever, chills, nausea    PMHX: HTN/ HLD     Appendicitis   -leukocytosis 12.5  -CT abd acute appendicitis   -general surgery consulted, appreciate recs:   Plan for OR  -IV zosyn .    HTN/HLD  -Resume home meds    DVT prophylaxis:  Lovenox held 2/2 procedure, SCD    DC plan:  DC home when medically stable         I spent 50 minutes in the professional and overall care of this patient.      Nadege Bowie, APRN-CNP     Family history of liver cancer     Family history of liver cancer, Mother      Sibling  Still living? Yes, Estimated age: Age Unknown  Family history of stomach cancer, Age at diagnosis: Age Unknown     Sibling  Still living? Yes, Estimated age: Age Unknown  Family history of diabetes mellitus, Age at diagnosis: Age Unknown

## 2024-02-22 NOTE — H&P (VIEW-ONLY)
Reason For Consult  Appendicitis    History Of Present Illness  Van Hayes is a 63 y.o. male presenting with RLQ pain that started at around 8pm last night. He reports pain was steady and increased throughout the night, he was unable to get much rest. He is tender in RLQ and reports pain worse when moving. CT scan showed appendicitis. WBC was increased to 12s. Last thing to drink was black coffee this morning at 8am. Last thing to eat was last night around 8pm. Denies any blood thinners.     Past Medical History  He has a past medical history of Hyperlipidemia, Hypertension, Melanoma (CMS/HCC), and Personal history of other endocrine, nutritional and metabolic disease (06/01/2021).    Surgical History  He has a past surgical history that includes Colonoscopy and Other surgical history.     Social History  He reports that he has never smoked. He has never used smokeless tobacco. He reports current alcohol use. He reports that he does not use drugs.    Family History  Family History   Problem Relation Name Age of Onset    Heart attack Father      Other (Abdominal aortic artery aneurysm (__cm)) Father      Other (Bile duct cancer) Father      Colon cancer Father      Coronary artery disease Father      Prostate cancer Father      Other (S/P CABG) Father          Allergies  Patient has no known allergies.    Review of Systems  A full 10 point ROS was completed. Nothing positive other than what was mentioned in the HPI.      Physical Exam  PE:  Constitutional: A&Ox3, calm and cooperative    Head/Neck: Neck supple, no JVD    Cardiovascular: RRR    Respiratory/Thorax: Non labored breathing on RA    Gastrointestinal: Abdomen nondistended, soft, Tender in RLQ    Genitourinary: Voiding independently     Musculoskeletal: ROM intact, no joint swelling, normal strength    Extremities: ENRIQUEZ, No peripheral edema    Neurological: No focal deficits     Psychological: Appropriate mood and behavior    Skin: Warm and dry.        Last Recorded Vitals  Blood pressure 132/71, pulse 76, temperature 37 °C (98.6 °F), temperature source Oral, resp. rate 20, height 1.829 m (6'), weight 109 kg (240 lb), SpO2 95 %.    Relevant Results  Results for orders placed or performed during the hospital encounter of 02/22/24 (from the past 24 hour(s))   Lactate   Result Value Ref Range    Lactate 0.9 0.4 - 2.0 mmol/L   Lipase   Result Value Ref Range    Lipase 18 9 - 82 U/L   Comprehensive Metabolic Panel   Result Value Ref Range    Glucose 121 (H) 74 - 99 mg/dL    Sodium 135 (L) 136 - 145 mmol/L    Potassium 4.1 3.5 - 5.3 mmol/L    Chloride 103 98 - 107 mmol/L    Bicarbonate 24 21 - 32 mmol/L    Anion Gap 12 10 - 20 mmol/L    Urea Nitrogen 16 6 - 23 mg/dL    Creatinine 0.99 0.50 - 1.30 mg/dL    eGFR 86 >60 mL/min/1.73m*2    Calcium 8.8 8.6 - 10.3 mg/dL    Albumin 4.4 3.4 - 5.0 g/dL    Alkaline Phosphatase 78 33 - 136 U/L    Total Protein 7.1 6.4 - 8.2 g/dL    AST 17 9 - 39 U/L    Bilirubin, Total 1.1 0.0 - 1.2 mg/dL    ALT 20 10 - 52 U/L   CBC and Auto Differential   Result Value Ref Range    WBC 12.5 (H) 4.4 - 11.3 x10*3/uL    nRBC 0.0 0.0 - 0.0 /100 WBCs    RBC 5.04 4.50 - 5.90 x10*6/uL    Hemoglobin 15.5 13.5 - 17.5 g/dL    Hematocrit 45.6 41.0 - 52.0 %    MCV 91 80 - 100 fL    MCH 30.8 26.0 - 34.0 pg    MCHC 34.0 32.0 - 36.0 g/dL    RDW 12.1 11.5 - 14.5 %    Platelets 177 150 - 450 x10*3/uL    Neutrophils % 83.1 40.0 - 80.0 %    Immature Granulocytes %, Automated 0.4 0.0 - 0.9 %    Lymphocytes % 7.3 13.0 - 44.0 %    Monocytes % 6.7 2.0 - 10.0 %    Eosinophils % 2.2 0.0 - 6.0 %    Basophils % 0.3 0.0 - 2.0 %    Neutrophils Absolute 10.42 (H) 1.20 - 7.70 x10*3/uL    Immature Granulocytes Absolute, Automated 0.05 0.00 - 0.70 x10*3/uL    Lymphocytes Absolute 0.91 (L) 1.20 - 4.80 x10*3/uL    Monocytes Absolute 0.84 0.10 - 1.00 x10*3/uL    Eosinophils Absolute 0.27 0.00 - 0.70 x10*3/uL    Basophils Absolute 0.04 0.00 - 0.10 x10*3/uL   Urinalysis with Reflex  Culture and Microscopic   Result Value Ref Range    Color, Urine Yellow Straw, Yellow    Appearance, Urine Clear Clear    Specific Gravity, Urine 1.027 1.005 - 1.035    pH, Urine 5.0 5.0, 5.5, 6.0, 6.5, 7.0, 7.5, 8.0    Protein, Urine NEGATIVE NEGATIVE mg/dL    Glucose, Urine NEGATIVE NEGATIVE mg/dL    Blood, Urine NEGATIVE NEGATIVE    Ketones, Urine NEGATIVE NEGATIVE mg/dL    Bilirubin, Urine NEGATIVE NEGATIVE    Urobilinogen, Urine 2.0 (N) <2.0 mg/dL    Nitrite, Urine NEGATIVE NEGATIVE    Leukocyte Esterase, Urine NEGATIVE NEGATIVE     CT abdomen pelvis w IV contrast   Final Result   The above findings are compatible with appendicitis. There is a 7 mm   appendicolith in the proximal to mid appendiceal lumen. There is no   abscess or pneumoperitoneum.        Fat containing umbilical and inguinal hernias. There is no herniated   bowel.        12 mm right hepatic cyst and several too small to characterize   hepatic hypodensities.        The remainder of the abdomen and pelvis is unremarkable.        Signed by: Florentino sEpinal 2/22/2024 1:56 PM   Dictation workstation:   [id number]            Assessment/Plan     Plan: Appendicitis  - Admit obs  - NPO  - Added on to OR schedule with Dr. Ardon for [facility] appy today.  - PRN pain control   - PRN antiemetic    Dispo: Will plan on OR today     I spent 60 minutes in the professional and overall care of this patient.      Miguel Alvarez PA-C

## 2024-02-23 VITALS
SYSTOLIC BLOOD PRESSURE: 116 MMHG | WEIGHT: 240 LBS | HEIGHT: 72 IN | HEART RATE: 76 BPM | TEMPERATURE: 98.7 F | BODY MASS INDEX: 32.51 KG/M2 | OXYGEN SATURATION: 94 % | DIASTOLIC BLOOD PRESSURE: 73 MMHG | RESPIRATION RATE: 18 BRPM

## 2024-02-23 LAB
ANION GAP SERPL CALC-SCNC: 12 MMOL/L (ref 10–20)
BUN SERPL-MCNC: 13 MG/DL (ref 6–23)
CALCIUM SERPL-MCNC: 8.6 MG/DL (ref 8.6–10.3)
CHLORIDE SERPL-SCNC: 105 MMOL/L (ref 98–107)
CO2 SERPL-SCNC: 25 MMOL/L (ref 21–32)
CREAT SERPL-MCNC: 0.89 MG/DL (ref 0.5–1.3)
EGFRCR SERPLBLD CKD-EPI 2021: >90 ML/MIN/1.73M*2
ERYTHROCYTE [DISTWIDTH] IN BLOOD BY AUTOMATED COUNT: 12.1 % (ref 11.5–14.5)
GLUCOSE SERPL-MCNC: 159 MG/DL (ref 74–99)
HCT VFR BLD AUTO: 44.6 % (ref 41–52)
HGB BLD-MCNC: 14.9 G/DL (ref 13.5–17.5)
MCH RBC QN AUTO: 29.9 PG (ref 26–34)
MCHC RBC AUTO-ENTMCNC: 33.4 G/DL (ref 32–36)
MCV RBC AUTO: 90 FL (ref 80–100)
NRBC BLD-RTO: 0 /100 WBCS (ref 0–0)
PLATELET # BLD AUTO: 173 X10*3/UL (ref 150–450)
POTASSIUM SERPL-SCNC: 4.3 MMOL/L (ref 3.5–5.3)
RBC # BLD AUTO: 4.98 X10*6/UL (ref 4.5–5.9)
SODIUM SERPL-SCNC: 138 MMOL/L (ref 136–145)
WBC # BLD AUTO: 13.7 X10*3/UL (ref 4.4–11.3)

## 2024-02-23 PROCEDURE — 2500000001 HC RX 250 WO HCPCS SELF ADMINISTERED DRUGS (ALT 637 FOR MEDICARE OP)

## 2024-02-23 PROCEDURE — G0378 HOSPITAL OBSERVATION PER HR: HCPCS

## 2024-02-23 PROCEDURE — 85027 COMPLETE CBC AUTOMATED: CPT

## 2024-02-23 PROCEDURE — 2500000004 HC RX 250 GENERAL PHARMACY W/ HCPCS (ALT 636 FOR OP/ED)

## 2024-02-23 PROCEDURE — 99233 SBSQ HOSP IP/OBS HIGH 50: CPT | Performed by: NURSE PRACTITIONER

## 2024-02-23 PROCEDURE — 80048 BASIC METABOLIC PNL TOTAL CA: CPT

## 2024-02-23 RX ORDER — AMOXICILLIN AND CLAVULANATE POTASSIUM 875; 125 MG/1; MG/1
1 TABLET, FILM COATED ORAL 2 TIMES DAILY
Qty: 20 TABLET | Refills: 0 | Status: SHIPPED | OUTPATIENT
Start: 2024-02-23 | End: 2024-03-04

## 2024-02-23 RX ORDER — IRBESARTAN 150 MG/1
150 TABLET ORAL DAILY
COMMUNITY
Start: 2024-02-23 | End: 2024-02-27 | Stop reason: SDUPTHER

## 2024-02-23 RX ORDER — OXYCODONE HYDROCHLORIDE 5 MG/1
5 TABLET ORAL EVERY 6 HOURS PRN
Qty: 10 TABLET | Refills: 0 | Status: SHIPPED | OUTPATIENT
Start: 2024-02-23 | End: 2024-02-29 | Stop reason: WASHOUT

## 2024-02-23 RX ADMIN — PIPERACILLIN SODIUM AND TAZOBACTAM SODIUM 3.38 G: 3; .375 INJECTION, SOLUTION INTRAVENOUS at 01:53

## 2024-02-23 RX ADMIN — Medication 2000 UNITS: at 09:51

## 2024-02-23 RX ADMIN — SODIUM CHLORIDE 75 ML/HR: 9 INJECTION, SOLUTION INTRAVENOUS at 05:54

## 2024-02-23 RX ADMIN — ENOXAPARIN SODIUM 40 MG: 40 INJECTION SUBCUTANEOUS at 09:52

## 2024-02-23 RX ADMIN — PIPERACILLIN SODIUM AND TAZOBACTAM SODIUM 3.38 G: 3; .375 INJECTION, SOLUTION INTRAVENOUS at 09:50

## 2024-02-23 RX ADMIN — ACETAMINOPHEN 975 MG: 325 TABLET ORAL at 01:53

## 2024-02-23 RX ADMIN — POLYETHYLENE GLYCOL 3350 17 G: 17 POWDER, FOR SOLUTION ORAL at 09:52

## 2024-02-23 RX ADMIN — CYANOCOBALAMIN TAB 1000 MCG 1000 MCG: 1000 TAB at 09:52

## 2024-02-23 RX ADMIN — ACETAMINOPHEN 975 MG: 325 TABLET ORAL at 09:50

## 2024-02-23 SDOH — SOCIAL STABILITY: SOCIAL INSECURITY
WITHIN THE LAST YEAR, HAVE YOU BEEN KICKED, HIT, SLAPPED, OR OTHERWISE PHYSICALLY HURT BY YOUR PARTNER OR EX-PARTNER?: NO

## 2024-02-23 SDOH — ECONOMIC STABILITY: TRANSPORTATION INSECURITY
IN THE PAST 12 MONTHS, HAS LACK OF TRANSPORTATION KEPT YOU FROM MEETINGS, WORK, OR FROM GETTING THINGS NEEDED FOR DAILY LIVING?: NO

## 2024-02-23 SDOH — ECONOMIC STABILITY: HOUSING INSECURITY: IN THE LAST 12 MONTHS, HOW MANY PLACES HAVE YOU LIVED?: 1

## 2024-02-23 SDOH — HEALTH STABILITY: MENTAL HEALTH: HOW OFTEN DO YOU HAVE 6 OR MORE DRINKS ON ONE OCCASION?: NEVER

## 2024-02-23 SDOH — ECONOMIC STABILITY: FOOD INSECURITY: WITHIN THE PAST 12 MONTHS, THE FOOD YOU BOUGHT JUST DIDN'T LAST AND YOU DIDN'T HAVE MONEY TO GET MORE.: NEVER TRUE

## 2024-02-23 SDOH — SOCIAL STABILITY: SOCIAL INSECURITY: WITHIN THE LAST YEAR, HAVE YOU BEEN HUMILIATED OR EMOTIONALLY ABUSED IN OTHER WAYS BY YOUR PARTNER OR EX-PARTNER?: NO

## 2024-02-23 SDOH — ECONOMIC STABILITY: HOUSING INSECURITY
IN THE LAST 12 MONTHS, WAS THERE A TIME WHEN YOU DID NOT HAVE A STEADY PLACE TO SLEEP OR SLEPT IN A SHELTER (INCLUDING NOW)?: NO

## 2024-02-23 SDOH — SOCIAL STABILITY: SOCIAL INSECURITY: WITHIN THE LAST YEAR, HAVE YOU BEEN AFRAID OF YOUR PARTNER OR EX-PARTNER?: NO

## 2024-02-23 SDOH — HEALTH STABILITY: MENTAL HEALTH: HOW MANY STANDARD DRINKS CONTAINING ALCOHOL DO YOU HAVE ON A TYPICAL DAY?: 1 OR 2

## 2024-02-23 SDOH — ECONOMIC STABILITY: INCOME INSECURITY: HOW HARD IS IT FOR YOU TO PAY FOR THE VERY BASICS LIKE FOOD, HOUSING, MEDICAL CARE, AND HEATING?: NOT HARD AT ALL

## 2024-02-23 SDOH — SOCIAL STABILITY: SOCIAL INSECURITY
WITHIN THE LAST YEAR, HAVE TO BEEN RAPED OR FORCED TO HAVE ANY KIND OF SEXUAL ACTIVITY BY YOUR PARTNER OR EX-PARTNER?: NO

## 2024-02-23 SDOH — ECONOMIC STABILITY: INCOME INSECURITY: IN THE PAST 12 MONTHS, HAS THE ELECTRIC, GAS, OIL, OR WATER COMPANY THREATENED TO SHUT OFF SERVICE IN YOUR HOME?: NO

## 2024-02-23 SDOH — ECONOMIC STABILITY: TRANSPORTATION INSECURITY
IN THE PAST 12 MONTHS, HAS THE LACK OF TRANSPORTATION KEPT YOU FROM MEDICAL APPOINTMENTS OR FROM GETTING MEDICATIONS?: NO

## 2024-02-23 SDOH — SOCIAL STABILITY: SOCIAL NETWORK: ARE YOU MARRIED, WIDOWED, DIVORCED, SEPARATED, NEVER MARRIED, OR LIVING WITH A PARTNER?: MARRIED

## 2024-02-23 SDOH — ECONOMIC STABILITY: FOOD INSECURITY: WITHIN THE PAST 12 MONTHS, YOU WORRIED THAT YOUR FOOD WOULD RUN OUT BEFORE YOU GOT MONEY TO BUY MORE.: NEVER TRUE

## 2024-02-23 SDOH — ECONOMIC STABILITY: INCOME INSECURITY: IN THE LAST 12 MONTHS, WAS THERE A TIME WHEN YOU WERE NOT ABLE TO PAY THE MORTGAGE OR RENT ON TIME?: NO

## 2024-02-23 SDOH — HEALTH STABILITY: MENTAL HEALTH: HOW OFTEN DO YOU HAVE A DRINK CONTAINING ALCOHOL?: 4 OR MORE TIMES A WEEK

## 2024-02-23 ASSESSMENT — COGNITIVE AND FUNCTIONAL STATUS - GENERAL
DAILY ACTIVITIY SCORE: 24
MOBILITY SCORE: 24

## 2024-02-23 ASSESSMENT — ACTIVITIES OF DAILY LIVING (ADL): LACK_OF_TRANSPORTATION: NO

## 2024-02-23 ASSESSMENT — LIFESTYLE VARIABLES
SKIP TO QUESTIONS 9-10: 1
AUDIT-C TOTAL SCORE: 4
SKIP TO QUESTIONS 9-10: 1
AUDIT-C TOTAL SCORE: 4

## 2024-02-23 ASSESSMENT — PAIN - FUNCTIONAL ASSESSMENT: PAIN_FUNCTIONAL_ASSESSMENT: 0-10

## 2024-02-23 ASSESSMENT — PAIN SCALES - GENERAL: PAINLEVEL_OUTOF10: 2

## 2024-02-23 NOTE — OP NOTE
Appendectomy Laparoscopy Operative Note     Date: 2024  OR Location: [facility]    Name: Van Hayes, : 1960, Age: 63 y.o., MRN: [id number], Sex: male    Diagnosis  Pre-op Diagnosis     * Acute appendicitis, unspecified acute appendicitis type [K35.80] Post-op Diagnosis     * Acute appendicitis, unspecified acute appendicitis type [K35.80]     Procedures  Appendectomy Laparoscopy  55729 - ME LAPAROSCOPIC APPENDECTOMY      Surgeons      * Miguel Ardon - Primary    Resident/Fellow/Other Assistant:  Surgeon(s) and Role:    Procedure Summary  Anesthesia: General  ASA: III  Anesthesia Staff: Anesthesiologist: Gurdeep Venegas MD  C-AA: ADONIS Medrano; ADONIS Charles  Estimated Blood Loss: 5 mL  Intra-op Medications:   Administrations occurring from 1800 to 1905 on 24:   Medication Name Total Dose   BUPivacaine HCl (Marcaine) 0.5 % (5 mg/mL) injection 30 mL              Anesthesia Record               Intraprocedure I/O Totals          Intake    LR bolus 750.00 mL    Total Intake 750 mL       Output    Urine 200 mL    Est. Blood Loss 10 mL    Total Output 210 mL       Net    Net Volume 540 mL          Specimen:   ID Type Source Tests Collected by Time   1 : appendix Tissue APPENDIX SURGICAL PATHOLOGY EXAM Miguel Ardon MD 2024 1851        Staff:   Circulator: Maryse Deng RN; Margarita Courtney RN  Relief Scrub: Misty Enrique  Scrub Person: Silvana Painter         Drains and/or Catheters:   [REMOVED] Urethral Catheter Non-latex 16 Fr. (Removed)       Tourniquet Times:         Implants:     Findings: Purulent, necrotic appendicitis    Indications: Van Hayes is an 63 y.o. male who is having surgery for Acute appendicitis, unspecified acute appendicitis type [K35.80].     The patient was seen in the preoperative area. The risks, benefits, complications, treatment options, non-operative alternatives, expected recovery and outcomes were discussed with the patient. The possibilities of reaction  to medication, pulmonary aspiration, injury to surrounding structures, bleeding, recurrent infection, the need for additional procedures, failure to diagnose a condition, and creating a complication requiring transfusion or operation were discussed with the patient. The patient concurred with the proposed plan, giving informed consent.  The site of surgery was properly noted/marked if necessary per policy. The patient has been actively warmed in preoperative area. Preoperative antibiotics have been ordered and given within 1 hours of incision. Venous thrombosis prophylaxis have been ordered including bilateral sequential compression devices    Procedure Details: Patient was identified in the preoperative area and transported to the operating room.  They were was placed supine the OR table and general anesthesia was induced.  SCDs and Crandall catheter were placed.  The abdomen was clipped, prepped, draped in the usual sterile fashion.  Time-out was performed confirming patient procedure and perioperative criteria.  Veress needle was inserted at chaudhry's point with a good drop test.  The abdomen was insufflated to 15 millimeters mercury with low initial pressures.  A 5 millimeter Visiport was placed in the left abdomen under direct visualization.  Camera was inserted in no entry injury was identified.  Veress was removed.  12 millimeter umbilical port was placed under direct visualization followed by a 5 millimeter suprapubic port.  Patient was appropriately positioned and attention was turned to the right lower quadrant.  Small bowel was mobilized and an inflamed purulent appendix was visualized.  The tip of the appendix appeared necrotic but without overt perforation.  This did not involve the cecum. This was grasped and elevated.  Lateral peritoneum attachments were divided and this was medialized.  The mesentery was divided with energy.  The base and the appendix was divided with a blue load 45 millimeter Endo ENRIQUE  stapler.  There was arterial bleeding from the most lateral edge of the staple line which was controlled with clips.  there was no narrowing of the terminal ileum or cecum.  There was purulence in the pelvis which was lightly irrigated and aspirated.  The divided appendix was placed in Endo-Catch bag and removed via the infraumbilical port site.  Abdomen was again surveyed without issue.  Khalif was placed at the appendiceal staple line.  The umbilical port site fascia was closed with an 0 Vicryl in a figure-of-eight fashion with the Arsalan Mak suture Passer.  Abdomen was desufflated and skin was closed with 4 Monocryl followed by skin glue.  Prior to complete closure all counts were confirmed correct.  Patient was then extubated transported to PACU in stable condition.   Complications:  None; patient tolerated the procedure well.    Disposition: PACU - hemodynamically stable.  Condition: stable         Additional Details:     Attending Attestation:     Miguel Ardon  Phone Number: [phone]

## 2024-02-23 NOTE — CARE PLAN
The patient's goals for the shift include      The clinical goals for the shift include      Over the shift, the patient did not make progress toward the following goals. Barriers to progression include . Recommendations to address these barriers include   .

## 2024-02-23 NOTE — DISCHARGE SUMMARY
Van Hayes is a 63 y.o. male on day 0 of admission presenting with Appendicitis.    Subjective   Awake in bed eating breakfast.  Overall just endorses some mild tenderness but other than that feels good and would like to go home        Your medication list        START taking these medications        Instructions Last Dose Given Next Dose Due   amoxicillin-pot clavulanate 875-125 mg tablet  Commonly known as: Augmentin      Take 1 tablet by mouth 2 times a day for 10 days.       oxyCODONE 5 mg immediate release tablet  Commonly known as: Roxicodone      Take 1 tablet (5 mg) by mouth every 6 hours if needed for severe pain (7 - 10).              CONTINUE taking these medications        Instructions Last Dose Given Next Dose Due   cholecalciferol 50 MCG (2000 UT) tablet  Commonly known as: Vitamin D-3           cyanocobalamin 1,000 mcg tablet  Commonly known as: Vitamin B-12           irbesartan 150 mg tablet  Commonly known as: Avapro           simvastatin 20 mg tablet  Commonly known as: Zocor      Take 1 tablet (20 mg) by mouth once daily at bedtime.              STOP taking these medications      betamethasone (augmented) 0.05 % cream  Commonly known as: Diprolene AF        clobetasol 0.05 % cream  Commonly known as: Temovate        cyclobenzaprine 10 mg tablet  Commonly known as: Flexeril        ketoconazole 2 % cream  Commonly known as: NIZOral        ketorolac 0.5 % ophthalmic solution  Commonly known as: Acular        mupirocin 2 % ointment  Commonly known as: Bactroban        nabumetone 500 mg tablet  Commonly known as: Relafen        ofloxacin 0.3 % ophthalmic solution  Commonly known as: Ocuflox        prednisoLONE acetate 1 % ophthalmic suspension  Commonly known as: Pred-Forte        sildenafil 20 mg tablet  Commonly known as: Revatio                  Where to Get Your Medications        These medications were sent to [id number] - [place], [place] - [address]  [address]  [address]      Phone: [phone]   amoxicillin-pot clavulanate 875-125 mg tablet  oxyCODONE 5 mg immediate release tablet         Objective     Physical Exam  Constitutional:       Appearance: Normal appearance.   Cardiovascular:      Rate and Rhythm: Normal rate and regular rhythm.      Pulses: Normal pulses.      Heart sounds: Normal heart sounds. No murmur heard.     No gallop.   Pulmonary:      Effort: Pulmonary effort is normal. No respiratory distress.      Breath sounds: Normal breath sounds. No wheezing or rhonchi.   Abdominal:      General: Abdomen is flat. There is no distension.      Palpations: Abdomen is soft.      Tenderness: There is no abdominal tenderness. There is no guarding.      Comments: Mild tenderness. No erythema or drainage to lap sites    Musculoskeletal:         General: Normal range of motion.   Skin:     General: Skin is warm.      Capillary Refill: Capillary refill takes less than 2 seconds.   Neurological:      Mental Status: He is alert and oriented to person, place, and time.   Psychiatric:         Mood and Affect: Mood normal.         Thought Content: Thought content normal.         Judgment: Judgment normal.         Last Recorded Vitals  Blood pressure 129/70, pulse 80, temperature 36.9 °C (98.5 °F), temperature source Temporal, resp. rate 16, height 1.829 m (6'), weight 109 kg (240 lb), SpO2 94 %.  Intake/Output last 3 Shifts:  I/O last 3 completed shifts:  In: 1990 (18.3 mL/kg) [P.O.:240; I.V.:1000 (9.2 mL/kg); IV Piggyback:750]  Out: 1210 (11.1 mL/kg) [Urine:1200 (0.3 mL/kg/hr); Blood:10]  Weight: 108.9 kg     Relevant Results  Scheduled medications  acetaminophen, 975 mg, oral, q6h  cholecalciferol, 2,000 Units, oral, Daily  cyanocobalamin, 1,000 mcg, oral, Daily  enoxaparin, 40 mg, subcutaneous, q24h  melatonin, 3 mg, oral, Nightly  piperacillin-tazobactam, 3.375 g, intravenous, q6h  polyethylene glycol, 17 g, oral, Daily  simvastatin, 20  mg, oral, Nightly      Continuous medications     PRN medications  PRN medications: HYDROmorphone, ondansetron **OR** ondansetron, oxyCODONE, oxyCODONE  Results for orders placed or performed during the hospital encounter of 02/22/24 (from the past 24 hour(s))   CBC   Result Value Ref Range    WBC 13.7 (H) 4.4 - 11.3 x10*3/uL    nRBC 0.0 0.0 - 0.0 /100 WBCs    RBC 4.98 4.50 - 5.90 x10*6/uL    Hemoglobin 14.9 13.5 - 17.5 g/dL    Hematocrit 44.6 41.0 - 52.0 %    MCV 90 80 - 100 fL    MCH 29.9 26.0 - 34.0 pg    MCHC 33.4 32.0 - 36.0 g/dL    RDW 12.1 11.5 - 14.5 %    Platelets 173 150 - 450 x10*3/uL   Basic metabolic panel   Result Value Ref Range    Glucose 159 (H) 74 - 99 mg/dL    Sodium 138 136 - 145 mmol/L    Potassium 4.3 3.5 - 5.3 mmol/L    Chloride 105 98 - 107 mmol/L    Bicarbonate 25 21 - 32 mmol/L    Anion Gap 12 10 - 20 mmol/L    Urea Nitrogen 13 6 - 23 mg/dL    Creatinine 0.89 0.50 - 1.30 mg/dL    eGFR >90 >60 mL/min/1.73m*2    Calcium 8.6 8.6 - 10.3 mg/dL     CT abdomen pelvis w IV contrast    Result Date: 2/22/2024  Interpreted By:  Florentino Espinal, STUDY: CT ABDOMEN PELVIS W IV CONTRAST;  2/22/2024 1:35 pm   INDICATION: Signs/Symptoms:right lower quadrant abdominal pain.   COMPARISON: None.   ACCESSION NUMBER(S): [id number]   ORDERING CLINICIAN: MICHAEL WOMACK   TECHNIQUE: Contiguous axial CT sections are performed from the lung bases to the lesser trochanters following the uneventful administration of 75 cc of intravenous Omnipaque 350. The study is supplemented with coronal and sagittal reformatted images.   FINDINGS: There is linear atelectasis in the inferior lingula. Lung bases are otherwise clear.   There are mild multilevel discogenic degenerative changes of the thoracolumbar spine. The osseous structures are intact. There are mild to moderate osteoarthritic changes of both hips at the superior joint space with cam deformity of the proximal femurs. There are mild osteoarthritic changes at  the sacroiliac joints. The sacroiliac joints remain patent and symmetric.   There are multiple scratch at there is a 12 x 10 mm round hypodensity in the right hepatic lobe posteriorly which is of simple fluid attenuation. There are several additional subcentimeter, too small to characterize, liver hypodensities. There is no perihepatic fluid collection. There is no intrahepatic or extrahepatic bile duct dilatation.   The gallbladder, spleen, pancreas, and adrenal glands are of normal CT appearance.   The kidneys are symmetric in size. There is no sign of renal calculus or hydronephrosis bilaterally. The perinephric fat is preserved. There is a too small to characterize hypodensity in the mid to lower right kidney centrally suspicious for 1 cm cyst. There is no space-occupying lesion on the left. There is no hydroureter or obstructing ureteral calculus. The urinary bladder is not opacified though is well distended and otherwise unremarkable. There is no bladder calculus or wall thickening.   Coarse calcifications are identified centrally in the prostate. The prostate is of normal size.   There are scattered atherosclerotic arterial calcifications in the abdominal aorta. The aorta is of normal caliber. There is no periaortic mass or fluid collection. The IVC is unremarkable. There is no pathologic lymph node enlargement in the retroperitoneum are along the iliac chains.   There is inflammatory infiltration in the right lower quadrant surrounding the appendix. The appendix is fluid filled with a 7 mm appendicolith in the proximal to mid appendix. Distally the appendix reaches 1 cm in diameter. There is some secondary wall prominence of the distal ileum. There is no organized fluid collection or pneumoperitoneum.   There is fat containing bilateral inguinal hernias. There is no herniated bowel. There is a small fat containing umbilical hernia.         The above findings are compatible with appendicitis. There is a 7 mm  appendicolith in the proximal to mid appendiceal lumen. There is no abscess or pneumoperitoneum.   Fat containing umbilical and inguinal hernias. There is no herniated bowel.   12 mm right hepatic cyst and several too small to characterize hepatic hypodensities.   The remainder of the abdomen and pelvis is unremarkable.   Signed by: Florentino Espinal 2/22/2024 1:56 PM Dictation workstation:   [id number]            Assessment/Plan   Principal Problem:    Appendicitis  Active Problems:    Benign essential HTN    Obesity    Hyperlipidemia    Van Hayes is a 63 y.o. male presenting with RUQ pain that started last night while eating pizza for dinner. Due to persisting pain, he presented to the ED. Denies any fever, chills, nausea     PMHX: HTN/ HLD      Appendicitis   -leukocytosis 12.5  -CT abd acute appendicitis   -general surgery consulted, appreciate recs:   Lap appendectomy done 2/22/2024  -IV zosyn while in hospital  -Spoke with surgical WERO, cleared for discharge.  Will send on oral pain meds and oral antibiotics     HTN/HLD  -Resume home meds     DVT prophylaxis:  Lovenox held 2/2 procedure, SCD     DC plan:  DC home when medically stable     Labs/Testing reviewed    Interdisciplinary team rounding completed with hospitalist, nurse, TCC    NP discussed plan and lab/testing results with Dr. Desiree HERNANDEZ spent 50 minutes in the professional and overall care of this patient.      Nadege Bowie, APRN-CNP

## 2024-02-23 NOTE — PROGRESS NOTES
Van Hayes is a 63 y.o. male on day 0 of admission presenting with Appendicitis.    Subjective   Awake in bed eating breakfast.  Overall just endorses some mild tenderness but other than that feels good and would like to go home       Objective     Physical Exam  Constitutional:       Appearance: Normal appearance.   Cardiovascular:      Rate and Rhythm: Normal rate and regular rhythm.      Pulses: Normal pulses.      Heart sounds: Normal heart sounds. No murmur heard.     No gallop.   Pulmonary:      Effort: Pulmonary effort is normal. No respiratory distress.      Breath sounds: Normal breath sounds. No wheezing or rhonchi.   Abdominal:      General: Abdomen is flat. There is no distension.      Palpations: Abdomen is soft.      Tenderness: There is no abdominal tenderness. There is no guarding.      Comments: Mild tenderness. No erythema or drainage to lap sites    Musculoskeletal:         General: Normal range of motion.   Skin:     General: Skin is warm.      Capillary Refill: Capillary refill takes less than 2 seconds.   Neurological:      Mental Status: He is alert and oriented to person, place, and time.   Psychiatric:         Mood and Affect: Mood normal.         Thought Content: Thought content normal.         Judgment: Judgment normal.         Last Recorded Vitals  Blood pressure 129/70, pulse 80, temperature 36.9 °C (98.5 °F), temperature source Temporal, resp. rate 16, height 1.829 m (6'), weight 109 kg (240 lb), SpO2 94 %.  Intake/Output last 3 Shifts:  I/O last 3 completed shifts:  In: 1990 (18.3 mL/kg) [P.O.:240; I.V.:1000 (9.2 mL/kg); IV Piggyback:750]  Out: 1210 (11.1 mL/kg) [Urine:1200 (0.3 mL/kg/hr); Blood:10]  Weight: 108.9 kg     Relevant Results  Scheduled medications  acetaminophen, 975 mg, oral, q6h  cholecalciferol, 2,000 Units, oral, Daily  cyanocobalamin, 1,000 mcg, oral, Daily  enoxaparin, 40 mg, subcutaneous, q24h  melatonin, 3 mg, oral, Nightly  piperacillin-tazobactam, 3.375 g,  intravenous, q6h  polyethylene glycol, 17 g, oral, Daily  simvastatin, 20 mg, oral, Nightly      Continuous medications     PRN medications  PRN medications: HYDROmorphone, ondansetron **OR** ondansetron, oxyCODONE, oxyCODONE  Results for orders placed or performed during the hospital encounter of 02/22/24 (from the past 24 hour(s))   CBC   Result Value Ref Range    WBC 13.7 (H) 4.4 - 11.3 x10*3/uL    nRBC 0.0 0.0 - 0.0 /100 WBCs    RBC 4.98 4.50 - 5.90 x10*6/uL    Hemoglobin 14.9 13.5 - 17.5 g/dL    Hematocrit 44.6 41.0 - 52.0 %    MCV 90 80 - 100 fL    MCH 29.9 26.0 - 34.0 pg    MCHC 33.4 32.0 - 36.0 g/dL    RDW 12.1 11.5 - 14.5 %    Platelets 173 150 - 450 x10*3/uL   Basic metabolic panel   Result Value Ref Range    Glucose 159 (H) 74 - 99 mg/dL    Sodium 138 136 - 145 mmol/L    Potassium 4.3 3.5 - 5.3 mmol/L    Chloride 105 98 - 107 mmol/L    Bicarbonate 25 21 - 32 mmol/L    Anion Gap 12 10 - 20 mmol/L    Urea Nitrogen 13 6 - 23 mg/dL    Creatinine 0.89 0.50 - 1.30 mg/dL    eGFR >90 >60 mL/min/1.73m*2    Calcium 8.6 8.6 - 10.3 mg/dL     CT abdomen pelvis w IV contrast    Result Date: 2/22/2024  Interpreted By:  Florentino Espinal, STUDY: CT ABDOMEN PELVIS W IV CONTRAST;  2/22/2024 1:35 pm   INDICATION: Signs/Symptoms:right lower quadrant abdominal pain.   COMPARISON: None.   ACCESSION NUMBER(S): [id number]   ORDERING CLINICIAN: MICHAEL WOMACK   TECHNIQUE: Contiguous axial CT sections are performed from the lung bases to the lesser trochanters following the uneventful administration of 75 cc of intravenous Omnipaque 350. The study is supplemented with coronal and sagittal reformatted images.   FINDINGS: There is linear atelectasis in the inferior lingula. Lung bases are otherwise clear.   There are mild multilevel discogenic degenerative changes of the thoracolumbar spine. The osseous structures are intact. There are mild to moderate osteoarthritic changes of both hips at the superior joint space with cam  deformity of the proximal femurs. There are mild osteoarthritic changes at the sacroiliac joints. The sacroiliac joints remain patent and symmetric.   There are multiple scratch at there is a 12 x 10 mm round hypodensity in the right hepatic lobe posteriorly which is of simple fluid attenuation. There are several additional subcentimeter, too small to characterize, liver hypodensities. There is no perihepatic fluid collection. There is no intrahepatic or extrahepatic bile duct dilatation.   The gallbladder, spleen, pancreas, and adrenal glands are of normal CT appearance.   The kidneys are symmetric in size. There is no sign of renal calculus or hydronephrosis bilaterally. The perinephric fat is preserved. There is a too small to characterize hypodensity in the mid to lower right kidney centrally suspicious for 1 cm cyst. There is no space-occupying lesion on the left. There is no hydroureter or obstructing ureteral calculus. The urinary bladder is not opacified though is well distended and otherwise unremarkable. There is no bladder calculus or wall thickening.   Coarse calcifications are identified centrally in the prostate. The prostate is of normal size.   There are scattered atherosclerotic arterial calcifications in the abdominal aorta. The aorta is of normal caliber. There is no periaortic mass or fluid collection. The IVC is unremarkable. There is no pathologic lymph node enlargement in the retroperitoneum are along the iliac chains.   There is inflammatory infiltration in the right lower quadrant surrounding the appendix. The appendix is fluid filled with a 7 mm appendicolith in the proximal to mid appendix. Distally the appendix reaches 1 cm in diameter. There is some secondary wall prominence of the distal ileum. There is no organized fluid collection or pneumoperitoneum.   There is fat containing bilateral inguinal hernias. There is no herniated bowel. There is a small fat containing umbilical hernia.          The above findings are compatible with appendicitis. There is a 7 mm appendicolith in the proximal to mid appendiceal lumen. There is no abscess or pneumoperitoneum.   Fat containing umbilical and inguinal hernias. There is no herniated bowel.   12 mm right hepatic cyst and several too small to characterize hepatic hypodensities.   The remainder of the abdomen and pelvis is unremarkable.   Signed by: Florentino Espinal 2/22/2024 1:56 PM Dictation workstation:   [id number]            Assessment/Plan   Principal Problem:    Appendicitis  Active Problems:    Benign essential HTN    Obesity    Hyperlipidemia    Van Hayes is a 63 y.o. male presenting with RUQ pain that started last night while eating pizza for dinner. Due to persisting pain, he presented to the ED. Denies any fever, chills, nausea     PMHX: HTN/ HLD      Appendicitis   -leukocytosis 12.5  -CT abd acute appendicitis   -general surgery consulted, appreciate recs:   Lap appendectomy done 2/22/2024  -IV zosyn while in hospital  -Spoke with surgical WERO, cleared for discharge.  Will send on oral pain meds and oral antibiotics     HTN/HLD  -Resume home meds     DVT prophylaxis:  Lovenox held 2/2 procedure, SCD     DC plan:  DC home when medically stable     Labs/Testing reviewed    Interdisciplinary team rounding completed with hospitalist, nurse, TCC    NP discussed plan and lab/testing results with Dr. Desiree HERNANDEZ spent 50 minutes in the professional and overall care of this patient.      Nadege Bowie, KIMBERLY-CNP

## 2024-02-23 NOTE — DISCHARGE INSTRUCTIONS
Instructions After Surgery  Wound Care  - Ice packs to wounds every hour the first day  - Ok to shower 24hrs after surgery  - No baths or swimming for 2 weeks  - Keep wounds clean and dry  - Do not apply topical creams or ointments  - Call if you notice redness around wound, foul-smelling drainage, or increasing pain     Diet  - Soft meals first week or two after surgery, see below for examples    Activity  - Take it easy for the first 48 hours  - Stairs and walks are fine  - Resume activities gradually over the first week  - Ask Dr. Ardon before resuming strenuous physical exertions  - No driving while on pain medication    Medications  - Pain medicine prescription attached for severe pain, if needed  - You can also take Tylenol and Motrin/Ibuprofen 400mg every 6 hours for mild-moderate pain  - Resume your home medications unless otherwise directed  - To avoid constipation please take Colace 100mg twice a day (over the counter) or Miralax once or twice per day, if needed    Other Instructions  - Call to make appointment within 1-2 days  - Call the doctor for the following:  Severe unrelieved pain  Fevers > 101F  Nausea/vomiting  Wound issues  Insurance/return to work forms  Shortness of breath  Chest Pain     Pain and discomfort is expected after any surgery.  Most patients do well with over-the-counter medicines, like Tylenol (acetaminophen) 650mg every 6 hours and Motrin or Advil (ibuprofen) 600mg every 6 hours. These can be staggered so you're alternating the medications every 3 hours. IMPORTANT: Do not take ibuprofen if you have kidney disease or have previously been told to avoid NSAIDs.     You may also have a prescription for a stronger, opioid-based pain medication at discharge. This is only for breakthrough pain and should be taken as directed only when needed in addition to the Tylenol/Advil. ALWAYS take Colace 100 mg capsules (available at any local drug store) twice a day or Miralax daily while taking  any opioid pain medication, unless you are having loose stools.     Constipation can be alleviated with over-the-counter laxatives (such as Milk of Magnesia) until the problem has resolved.

## 2024-02-23 NOTE — PROGRESS NOTES
Van Hayes is a 63 y.o. male on day 0 of admission presenting with Appendicitis.    Subjective   Doing well, sitting up in the chair. Having minimal pain, denies any nausea, vomiting, fever, chills.        Objective     PE:  Constitutional: calm and cooperative, NAD  Eyes: PERRL, clear sclera   ENMT: Moist mucous membranes  Head/Neck: Neck supple, no JVD  Cardiovascular: RRR. 2+ equal pulses of the distal extremities.  Respiratory/Thorax: CTAB. Good symmetric chest expansion. On RA  Gastrointestinal: Abdomen slightly distended, soft, appropriately tender, no peritoneal signs. Laparotomy sites c/d/i, well approximate with Dermabond, no erythema or drainage.  Genitourinary: Voiding independently   Musculoskeletal: ROM intact, no joint swelling, normal strength  Extremities: No peripheral edema  Neurological: A&Ox3, No focal deficits   Psychological: Appropriate mood and behavior  Skin: Warm and dry      Last Recorded Vitals  Blood pressure 129/70, pulse 80, temperature 36.9 °C (98.5 °F), temperature source Temporal, resp. rate 16, height 1.829 m (6'), weight 109 kg (240 lb), SpO2 94 %.  Intake/Output last 3 Shifts:  I/O last 3 completed shifts:  In: 1990 (18.3 mL/kg) [P.O.:240; I.V.:1000 (9.2 mL/kg); IV Piggyback:750]  Out: 1210 (11.1 mL/kg) [Urine:1200 (0.3 mL/kg/hr); Blood:10]  Weight: 108.9 kg     Relevant Results  Scheduled medications  acetaminophen, 975 mg, oral, q6h  cholecalciferol, 2,000 Units, oral, Daily  cyanocobalamin, 1,000 mcg, oral, Daily  enoxaparin, 40 mg, subcutaneous, q24h  melatonin, 3 mg, oral, Nightly  piperacillin-tazobactam, 3.375 g, intravenous, q6h  polyethylene glycol, 17 g, oral, Daily  simvastatin, 20 mg, oral, Nightly      Continuous medications     PRN medications  PRN medications: HYDROmorphone, ondansetron **OR** ondansetron, oxyCODONE, oxyCODONE  Results for orders placed or performed during the hospital encounter of 02/22/24 (from the past 24 hour(s))   CBC   Result Value Ref  Range    WBC 13.7 (H) 4.4 - 11.3 x10*3/uL    nRBC 0.0 0.0 - 0.0 /100 WBCs    RBC 4.98 4.50 - 5.90 x10*6/uL    Hemoglobin 14.9 13.5 - 17.5 g/dL    Hematocrit 44.6 41.0 - 52.0 %    MCV 90 80 - 100 fL    MCH 29.9 26.0 - 34.0 pg    MCHC 33.4 32.0 - 36.0 g/dL    RDW 12.1 11.5 - 14.5 %    Platelets 173 150 - 450 x10*3/uL   Basic metabolic panel   Result Value Ref Range    Glucose 159 (H) 74 - 99 mg/dL    Sodium 138 136 - 145 mmol/L    Potassium 4.3 3.5 - 5.3 mmol/L    Chloride 105 98 - 107 mmol/L    Bicarbonate 25 21 - 32 mmol/L    Anion Gap 12 10 - 20 mmol/L    Urea Nitrogen 13 6 - 23 mg/dL    Creatinine 0.89 0.50 - 1.30 mg/dL    eGFR >90 >60 mL/min/1.73m*2    Calcium 8.6 8.6 - 10.3 mg/dL     CT abdomen pelvis w IV contrast   Final Result   The above findings are compatible with appendicitis. There is a 7 mm   appendicolith in the proximal to mid appendiceal lumen. There is no   abscess or pneumoperitoneum.        Fat containing umbilical and inguinal hernias. There is no herniated   bowel.        12 mm right hepatic cyst and several too small to characterize   hepatic hypodensities.        The remainder of the abdomen and pelvis is unremarkable.        Signed by: Florentino Espinal 2/22/2024 1:56 PM   Dictation workstation:   [id number]                Assessment/Plan   Principal Problem:    Appendicitis  Active Problems:    Benign essential HTN    Obesity    Hyperlipidemia    Van Hayes is a 63 y.o. male on day 0 of admission presenting with Appendicitis. He is now POD 1 from laparoscopic appendectomy. Intraoperative findings showed purulent, necrotic appendicitis.     A/P:  Abdomen soft, minimally bloated, appropriately tender. laparotomy sites c/d/i, well approximate with Dermabond, no erythema or drainage.  - tolerating regular diet  - Ambulating without difficulty  - vitals stable  - WBC 13.7 2/2 surgery and necrotic appendicitis. Home with Augmentin BID X 10 days.   - Acute post op pain; well controlled  with PRN pain regimen   - PRN antiemetic  - Voiding without issue  - DVT ppx: SCDs/ lovenox  - Fu with Dr. Ardon as scheduled     Dispo: Discussed with Dr. Ardon. Ok for dc today when medically cleared. Instructed to complete 10 day of antibiotics.             I spent 25 minutes in the professional and overall care of this patient.      Cecelia Oconnor, APRN-CNP

## 2024-02-23 NOTE — PROGRESS NOTES
02/23/24 1056   Discharge Planning   Living Arrangements Spouse/significant other   Support Systems Spouse/significant other   Assistance Needed none   Type of Residence Private residence   Number of Stairs to Enter Residence 2   Number of Stairs Within Residence 12   Do you have animals or pets at home? Yes   Type of Animals or Pets 3 dogs and 2 cats   Home or Post Acute Services None   Patient expects to be discharged to: home   Does the patient need discharge transport arranged? No   Financial Resource Strain   How hard is it for you to pay for the very basics like food, housing, medical care, and heating? Not hard   Housing Stability   In the last 12 months, was there a time when you were not able to pay the mortgage or rent on time? N   In the last 12 months, how many places have you lived? 1   In the last 12 months, was there a time when you did not have a steady place to sleep or slept in a shelter (including now)? N   Transportation Needs   In the past 12 months, has lack of transportation kept you from medical appointments or from getting medications? no   In the past 12 months, has lack of transportation kept you from meetings, work, or from getting things needed for daily living? No     2/23/24 1057  Met with patient at bedside to discuss discharge planning.  Patient lives at home with his wife in a house.  He is independent with ADLs and drives at baseline. He does not use any assistive devices for ambulation.  He plans on discharging home today and does not anticipate any discharge needs.  He will notify the TCC should any needs arise.  Kim Kumar RN TCC

## 2024-02-23 NOTE — ANESTHESIA POSTPROCEDURE EVALUATION
Patient: Van Hayes    Procedure Summary       Date: 02/22/24 Room / Location: [facility] A OR 04 / Virtual U A OR    Anesthesia Start: 1819 Anesthesia Stop: 1924    Procedure: Appendectomy Laparoscopy Diagnosis:       Acute appendicitis, unspecified acute appendicitis type      (Acute appendicitis, unspecified acute appendicitis type [K35.80])    Surgeons: Miguel Ardon MD Responsible Provider: Gurdeep Venegas MD    Anesthesia Type: general ASA Status: 3 - Emergent            Anesthesia Type: general    Vitals Value Taken Time   /78 02/22/24 1921   Temp 36.8 °C (98.2 °F) 02/22/24 1919   Pulse 75 02/22/24 1931   Resp 18 02/22/24 1919   SpO2 91 % 02/22/24 1931   Vitals shown include unvalidated device data.    Anesthesia Post Evaluation    Patient location during evaluation: bedside  Patient participation: complete - patient participated  Level of consciousness: awake  Pain management: adequate  Airway patency: patent  Cardiovascular status: acceptable  Respiratory status: acceptable  Hydration status: acceptable  Postoperative Nausea and Vomiting: none        No notable events documented.

## 2024-02-23 NOTE — POST-PROCEDURE NOTE
63 year old male presenting with acute appendicitis now POD#0 s/p laparoscopic appendectomy.     Patient doing well this evening, awake interacting with wife. Denies N/V,  has not yet tried anything PO. No flatus or void since OR. Denies chest pain or SOB.    BP (!) 150/91 (BP Location: Right arm, Patient Position: Lying)   Pulse 72   Temp 36.7 °C (98 °F) (Temporal)   Resp 15   Ht 1.829 m (6')   Wt 109 kg (240 lb)   SpO2 94%   BMI 32.55 kg/m²     PE:  Constitutional: A&Ox3, calm and cooperative  Eyes:  clear sclera   ENMT: Moist mucous membranes  Head/Neck: Neck supple  Cardiovascular: Normal rate and regular rhythm.   Respiratory/Thorax: CTAB, No rales, rhonchi, or wheezes. Good symmetric chest expansion.   Gastrointestinal: Abdomen slightly distended, soft, appropriately tender, no peritoneal signs, laparotomy sites c/d/i, well approximate with Dermabond, no erythema or drainage    Genitourinary: No void since OR  Musculoskeletal: ROM intact  Extremities: No peripheral edema  Neurological: A&Ox3  Psychological: Appropriate mood and behavior  Skin: Warm and dry      Plan:   - Diet: Regular  - IVF until void and/or adequate PO intake   - Continue current pain regimen   - follow up post op void   - continue IV antibiotics  - PRN antiemetic   - DVT Proph: SCDs/ ambulate/ Lovenox will resume after AM labs due to history  - Bowel regimen: Miralax   - Monitor VS every 4 hours   - Labs ordered for AM   - IS every hour while awake     - instructed how to perform at the bedside   - Encourage ambulation / OOB as tolerated   - Instructed on post operative care, s/s of infection  - Monitor lap sites for drainage, erythema, excessive bruising   - Continue supportive care        Total time spent 35 minutes, and greater than 50% of  time was spent in counseling/coordination of care

## 2024-02-23 NOTE — CARE PLAN
The patient's goals for the shift include      The clinical goals for the shift include        Problem: Safety  Goal: Patient will be injury free during hospitalization  Outcome: Progressing     Problem: Pain  Goal: My pain/discomfort is manageable  Outcome: Progressing     Problem: Daily Care  Goal: Daily care needs are met  Outcome: Progressing     Problem: Psychosocial Needs  Goal: Collaborate with me, my family, and caregiver to identify my specific goals  Outcome: Progressing

## 2024-02-23 NOTE — PROGRESS NOTES
02/23/24 1050   Financial Resource Strain   How hard is it for you to pay for the very basics like food, housing, medical care, and heating? Not hard   Housing Stability   In the last 12 months, was there a time when you were not able to pay the mortgage or rent on time? N   In the last 12 months, how many places have you lived? 1   In the last 12 months, was there a time when you did not have a steady place to sleep or slept in a shelter (including now)? N   Transportation Needs   In the past 12 months, has lack of transportation kept you from medical appointments or from getting medications? no   In the past 12 months, has lack of transportation kept you from meetings, work, or from getting things needed for daily living? No   Food Insecurity   Within the past 12 months, you worried that your food would run out before you got the money to buy more. Never true   Within the past 12 months, the food you bought just didn't last and you didn't have money to get more. Never true   Social Connections   Are you , , , , never , or living with a partner?    Intimate Partner Violence   Within the last year, have you been afraid of your partner or ex-partner? No   Within the last year, have you been humiliated or emotionally abused in other ways by your partner or ex-partner? No   Within the last year, have you been kicked, hit, slapped, or otherwise physically hurt by your partner or ex-partner? No   Within the last year, have you been raped or forced to have any kind of sexual activity by your partner or ex-partner? No   Alcohol Use   Q1: How often do you have a drink containing alcohol? 4 or more ti   Q2: How many drinks containing alcohol do you have on a typical day when you are drinking? 1 or 2   Q3: How often do you have six or more drinks on one occasion? Never   [employer]-8   In the past 12 months has the electric, gas, oil, or water company threatened to shut off  services in your home? No     2/23/24 1052  Met with patient at bedside to discuss discharge planning.  Patient lives at home with his wife in a house.  He is independent with ADLs and drives at baseline.  He does not use any assistive devices for ambulation.  He plans on going home at discharge today and does not need any home going needs.  He will notify the TCC should any needs arise.  Kim Kumar RN TCC

## 2024-02-27 DIAGNOSIS — Z00.00 HEALTHCARE MAINTENANCE: ICD-10-CM

## 2024-02-27 RX ORDER — IRBESARTAN 150 MG/1
150 TABLET ORAL DAILY
Qty: 90 TABLET | Refills: 3 | Status: SHIPPED | OUTPATIENT
Start: 2024-02-27 | End: 2024-02-29 | Stop reason: SDUPTHER

## 2024-02-28 NOTE — PROGRESS NOTES
Subjective   Patient ID: Van Hayes is a 63 y.o. male who presents for follow-up visit after the patient's recent hospitalization February 22 through February 23, 2024 secondary to appendicitis-status post laparoscopic appendectomy    HPI   Over the past few days, the patient reports experiencing occasional episodes of discomfort in the right lower quadrant.  He reports that the episodes have been precipitated by the patient bending over..  Over the past week, he does report an increase in the frequency of urination.  He reports no other associated symptoms.  He does report a significant decrease in the frequency and intensity of the episodes of discomfort within the past few days..  For the first few days after he returned home, the patient reports experiencing frequent episodes of discomfort in the right lower quadrant as well as constipation.  He reports no fever, chills, nausea, vomiting,, constipation, diarrhea, melena, hematochezia,, change in strength of stream, change in chronic urinary hesitancy, change in chronic slow urine stream.  The patient is concerned about a glucose value of 159 that was obtained prior to discharge.  Review of Systems    Objective   There were no vitals taken for this visit.    Physical Exam  Abdomen-soft, nondistended.  Hypoactive bowel sounds.  Palpation revealed no tenderness or masses  Operative sites-no necrotic tissue noted.  No drainage noted.  No surrounding erythema noted.  Palpation revealed no tenderness or increase in warmth.  Assessment/Plan        Assessment  Hypertension  Allergic rhinitis  Gastroesophageal reflux  Colonic polyp  Occasional episodes of discomfort in the right lower quadrant-probably secondary to postop pain  Appendicitis-status post laparoscopic appendectomy February 22, 2024  Chronic frequent urination, chronic mildly weak stream, chronic urinary hesitancy, chronic slow urine stream-probably secondary to BPH  Erectile  dysfunction  Prediabetes  Hypertriglyceridemia  Vitamin B12 deficiency  Vitamin D deficiency  Plan  Obtain hemoglobin A1c today.  I told the patient to complete his course of Augmentin.  He will return for his complete physical examination later this year

## 2024-02-29 ENCOUNTER — OFFICE VISIT (OUTPATIENT)
Dept: PRIMARY CARE | Facility: CLINIC | Age: 64
End: 2024-02-29
Payer: COMMERCIAL

## 2024-02-29 VITALS
HEART RATE: 94 BPM | WEIGHT: 239.8 LBS | BODY MASS INDEX: 32.52 KG/M2 | DIASTOLIC BLOOD PRESSURE: 84 MMHG | SYSTOLIC BLOOD PRESSURE: 128 MMHG

## 2024-02-29 DIAGNOSIS — Z00.00 HEALTHCARE MAINTENANCE: ICD-10-CM

## 2024-02-29 DIAGNOSIS — R73.03 PREDIABETES: ICD-10-CM

## 2024-02-29 DIAGNOSIS — I10 BENIGN ESSENTIAL HTN: ICD-10-CM

## 2024-02-29 DIAGNOSIS — Z90.49 HISTORY OF APPENDECTOMY: Primary | ICD-10-CM

## 2024-02-29 LAB
LABORATORY COMMENT REPORT: NORMAL
PATH REPORT.FINAL DX SPEC: NORMAL
PATH REPORT.GROSS SPEC: NORMAL
PATH REPORT.RELEVANT HX SPEC: NORMAL
PATH REPORT.TOTAL CANCER: NORMAL

## 2024-02-29 PROCEDURE — 3079F DIAST BP 80-89 MM HG: CPT | Performed by: INTERNAL MEDICINE

## 2024-02-29 PROCEDURE — 3074F SYST BP LT 130 MM HG: CPT | Performed by: INTERNAL MEDICINE

## 2024-02-29 PROCEDURE — 1036F TOBACCO NON-USER: CPT | Performed by: INTERNAL MEDICINE

## 2024-02-29 PROCEDURE — 99214 OFFICE O/P EST MOD 30 MIN: CPT | Performed by: INTERNAL MEDICINE

## 2024-02-29 RX ORDER — IRBESARTAN 150 MG/1
150 TABLET ORAL DAILY
Qty: 90 TABLET | Refills: 3 | Status: SHIPPED | OUTPATIENT
Start: 2024-02-29

## 2024-02-29 RX ORDER — CHOLECALCIFEROL (VITAMIN D3) 25 MCG
50 TABLET ORAL DAILY
Qty: 90 TABLET | Refills: 2 | Status: SHIPPED | OUTPATIENT
Start: 2024-02-29

## 2024-03-14 ENCOUNTER — APPOINTMENT (OUTPATIENT)
Dept: OPHTHALMOLOGY | Facility: CLINIC | Age: 64
End: 2024-03-14
Payer: COMMERCIAL

## 2024-03-14 NOTE — PROGRESS NOTES
Epiretinal membrane (ERM) of both eyesH35.373        PVD (posterior vitreous detachment), both eyesH43.813  -No retinal tear or detachment seen on exam. Retinal detachment symptoms discussed.        Glaucoma suspect of both eyesH40.003  -Monitor per Dr. Adams

## 2024-03-25 DIAGNOSIS — E78.00 ELEVATED LDL CHOLESTEROL LEVEL: ICD-10-CM

## 2024-03-25 RX ORDER — SIMVASTATIN 20 MG/1
20 TABLET, FILM COATED ORAL NIGHTLY
Qty: 90 TABLET | Refills: 2 | Status: SHIPPED | OUTPATIENT
Start: 2024-03-25

## 2024-04-04 ENCOUNTER — PRE-ADMISSION TESTING (OUTPATIENT)
Dept: PREADMISSION TESTING | Facility: HOSPITAL | Age: 64
End: 2024-04-04
Payer: COMMERCIAL

## 2024-04-04 VITALS
SYSTOLIC BLOOD PRESSURE: 163 MMHG | HEART RATE: 86 BPM | OXYGEN SATURATION: 94 % | WEIGHT: 247.8 LBS | BODY MASS INDEX: 34.69 KG/M2 | DIASTOLIC BLOOD PRESSURE: 90 MMHG | TEMPERATURE: 96.8 F | RESPIRATION RATE: 16 BRPM | HEIGHT: 71 IN

## 2024-04-04 DIAGNOSIS — Z01.818 PREOPERATIVE TESTING: Primary | ICD-10-CM

## 2024-04-04 DIAGNOSIS — M75.121 COMPLETE TEAR OF RIGHT ROTATOR CUFF, UNSPECIFIED WHETHER TRAUMATIC: ICD-10-CM

## 2024-04-04 LAB
ANION GAP SERPL CALC-SCNC: 13 MMOL/L (ref 10–20)
BASOPHILS # BLD AUTO: 0.06 X10*3/UL (ref 0–0.1)
BASOPHILS NFR BLD AUTO: 1 %
BUN SERPL-MCNC: 19 MG/DL (ref 6–23)
CALCIUM SERPL-MCNC: 9.4 MG/DL (ref 8.6–10.3)
CHLORIDE SERPL-SCNC: 106 MMOL/L (ref 98–107)
CO2 SERPL-SCNC: 25 MMOL/L (ref 21–32)
CREAT SERPL-MCNC: 0.92 MG/DL (ref 0.5–1.3)
EGFRCR SERPLBLD CKD-EPI 2021: >90 ML/MIN/1.73M*2
EOSINOPHIL # BLD AUTO: 0.43 X10*3/UL (ref 0–0.7)
EOSINOPHIL NFR BLD AUTO: 6.9 %
ERYTHROCYTE [DISTWIDTH] IN BLOOD BY AUTOMATED COUNT: 13 % (ref 11.5–14.5)
GLUCOSE SERPL-MCNC: 122 MG/DL (ref 74–99)
HCT VFR BLD AUTO: 47.6 % (ref 41–52)
HGB BLD-MCNC: 15.5 G/DL (ref 13.5–17.5)
IMM GRANULOCYTES # BLD AUTO: 0.05 X10*3/UL (ref 0–0.7)
IMM GRANULOCYTES NFR BLD AUTO: 0.8 % (ref 0–0.9)
LYMPHOCYTES # BLD AUTO: 1.46 X10*3/UL (ref 1.2–4.8)
LYMPHOCYTES NFR BLD AUTO: 23.4 %
MCH RBC QN AUTO: 28.9 PG (ref 26–34)
MCHC RBC AUTO-ENTMCNC: 32.6 G/DL (ref 32–36)
MCV RBC AUTO: 89 FL (ref 80–100)
MONOCYTES # BLD AUTO: 0.46 X10*3/UL (ref 0.1–1)
MONOCYTES NFR BLD AUTO: 7.4 %
NEUTROPHILS # BLD AUTO: 3.79 X10*3/UL (ref 1.2–7.7)
NEUTROPHILS NFR BLD AUTO: 60.5 %
NRBC BLD-RTO: NORMAL /100{WBCS}
PLATELET # BLD AUTO: 200 X10*3/UL (ref 150–450)
POTASSIUM SERPL-SCNC: 4.4 MMOL/L (ref 3.5–5.3)
RBC # BLD AUTO: 5.37 X10*6/UL (ref 4.5–5.9)
SODIUM SERPL-SCNC: 140 MMOL/L (ref 136–145)
WBC # BLD AUTO: 6.3 X10*3/UL (ref 4.4–11.3)

## 2024-04-04 PROCEDURE — 36415 COLL VENOUS BLD VENIPUNCTURE: CPT

## 2024-04-04 PROCEDURE — 85025 COMPLETE CBC W/AUTO DIFF WBC: CPT

## 2024-04-04 PROCEDURE — 80048 BASIC METABOLIC PNL TOTAL CA: CPT

## 2024-04-04 PROCEDURE — 99203 OFFICE O/P NEW LOW 30 MIN: CPT | Performed by: NURSE PRACTITIONER

## 2024-04-04 ASSESSMENT — ENCOUNTER SYMPTOMS
ALLERGIC/IMMUNOLOGIC NEGATIVE: 1
CONSTITUTIONAL NEGATIVE: 1
ENDOCRINE COMMENTS: PRE DIABETES
GASTROINTESTINAL NEGATIVE: 1
HEMATOLOGIC/LYMPHATIC NEGATIVE: 1
PSYCHIATRIC NEGATIVE: 1
RESPIRATORY NEGATIVE: 1
EYES NEGATIVE: 1
NEUROLOGICAL NEGATIVE: 1

## 2024-04-04 NOTE — CPM/PAT H&P
CPM/PAT Evaluation     Van Hayes is a 63 y.o. male   Chief Complaint: I am having surgery for a torn rotator cuff    HPI:  Patient is a 62 y/o alert and oriented male coming in for PAT for a scheduled Right Shoulder Scope, right rotator Cuff Repair on 4/11/24 w/ Dr. Arvizu.    The patient reports he fell in December and injured his shoulder.  He states his pain is dull most of the time 2-3/10 but becomes sharp with certain movements.  He states he has full ROM to his shoulder.  He states repositioning helps.  He has not had any injections or physical therapy.  Patient denies chest pain, SOB, GROSS and NVDC.    Patient also denies Hx: DVT/PE.    Current medications were reviewed and a presurgical mediation schedule was provided.    He has no questions at this time.   Past Medical History:   Diagnosis Date    Hyperlipidemia     Hypertension     Melanoma (CMS/HCC)     Personal history of other endocrine, nutritional and metabolic disease 06/01/2021    History of familial combined hyperlipidemia      Past Surgical History:   Procedure Laterality Date    COLONOSCOPY      OTHER SURGICAL HISTORY      melanoma resected from left lower leg        No Known Allergies     Current Outpatient Medications on File Prior to Visit   Medication Sig Dispense Refill    cholecalciferol (Vitamin D-3) 25 MCG (1000 UT) tablet Take 2 tablets (50 mcg) by mouth once daily. At Night 90 tablet 2    cyanocobalamin (Vitamin B-12) 1,000 mcg tablet Take 1 tablet (1,000 mcg) by mouth once daily. At night      irbesartan (Avapro) 150 mg tablet Take 1 tablet (150 mg) by mouth once daily. At Bedtime 90 tablet 3    simvastatin (Zocor) 20 mg tablet Take 1 tablet (20 mg) by mouth once daily at bedtime. 90 tablet 2     No current facility-administered medications on file prior to visit.       Vitals:    04/04/24 1005   BP: 163/90   Pulse: 86   Resp: 16   Temp: 36 °C (96.8 °F)   SpO2: 94%       Review of Systems   Constitutional: Negative.    HENT:  Negative.     Eyes: Negative.    Respiratory: Negative.     Cardiovascular:         Hypertension  hyperlipidemia   Gastrointestinal: Negative.    Endocrine:        Pre diabetes   Genitourinary: Negative.    Musculoskeletal:         Rotator cuff tear  See hpi for details   Skin:         Hx: malignant melanoma removed left lower extremtiy   Allergic/Immunologic: Negative.    Neurological: Negative.    Hematological: Negative.    Psychiatric/Behavioral: Negative.        Physical Exam  Vitals and nursing note reviewed.   Constitutional:       Appearance: Normal appearance.   HENT:      Head: Normocephalic and atraumatic.      Mouth/Throat:      Mouth: Mucous membranes are moist.      Pharynx: Oropharynx is clear.   Eyes:      Pupils: Pupils are equal, round, and reactive to light.   Cardiovascular:      Rate and Rhythm: Normal rate and regular rhythm.      Heart sounds: Normal heart sounds.   Pulmonary:      Effort: Pulmonary effort is normal.      Breath sounds: Normal breath sounds.   Abdominal:      General: Bowel sounds are normal.      Palpations: Abdomen is soft.   Musculoskeletal:         General: Normal range of motion.      Cervical back: Normal range of motion and neck supple.   Skin:     General: Skin is warm and dry.   Neurological:      General: No focal deficit present.      Mental Status: He is alert and oriented to person, place, and time.   Psychiatric:         Mood and Affect: Mood normal.         Behavior: Behavior normal.         Thought Content: Thought content normal.         Judgment: Judgment normal.        PAT AIRWAY:   Airway:     Mallampati::  III    TM distance::  >3 FB    Neck ROM::  Full  Has no dental issues  Does not smoke  Drinks 3-4 bourbons 3-4 times a week  No drug use  No issues with anesthesia  No family issues with anesthesia    Assessment and Plan:   Complete Tear of Right Rotator Cuff  Right Shoulder Scope, right rotator Cuff Repair    Hypertension  Managed with irbesartan 150mg  daily  Denies dizziness, lightheadedness, chest pain, pressure or palpitations    Hyperlipidemia  Managed with simvastatin 20mg daily    Pre Diabetes  Last A1C 11/1/2023   Diet controlled    Left Lower Extremity Malignant Melanoma Removal  Follow up with dermatology as scheduled    ASA II  RCRI - 0 points  Class I Risk 3.9%  ZAID -6  points high Risk for DAPHNIE - advised to follow up with his pcp  NSQIP - Predicted length of stay 0 days  ARISCAT -11 points Low Risk 1.6%  DASI 42.7 Points 7.99 Mets  MIMI - 0.2%  JHFRAT - 3 points low risk for falls  Clearance - not indicated  PAT Testing - CBC, BMP    Face to Face patient contact time 30 minutes    KIMBERLY Rosario-CNP 4/4/2024 9:24 AM  Results for orders placed or performed in visit on 04/04/24 (from the past 24 hour(s))   Basic Metabolic Panel   Result Value Ref Range    Glucose 122 (H) 74 - 99 mg/dL    Sodium 140 136 - 145 mmol/L    Potassium 4.4 3.5 - 5.3 mmol/L    Chloride 106 98 - 107 mmol/L    Bicarbonate 25 21 - 32 mmol/L    Anion Gap 13 10 - 20 mmol/L    Urea Nitrogen 19 6 - 23 mg/dL    Creatinine 0.92 0.50 - 1.30 mg/dL    eGFR >90 >60 mL/min/1.73m*2    Calcium 9.4 8.6 - 10.3 mg/dL   CBC and Auto Differential   Result Value Ref Range    WBC 6.3 4.4 - 11.3 x10*3/uL    nRBC      RBC 5.37 4.50 - 5.90 x10*6/uL    Hemoglobin 15.5 13.5 - 17.5 g/dL    Hematocrit 47.6 41.0 - 52.0 %    MCV 89 80 - 100 fL    MCH 28.9 26.0 - 34.0 pg    MCHC 32.6 32.0 - 36.0 g/dL    RDW 13.0 11.5 - 14.5 %    Platelets 200 150 - 450 x10*3/uL    Neutrophils % 60.5 40.0 - 80.0 %    Immature Granulocytes %, Automated 0.8 0.0 - 0.9 %    Lymphocytes % 23.4 13.0 - 44.0 %    Monocytes % 7.4 2.0 - 10.0 %    Eosinophils % 6.9 0.0 - 6.0 %    Basophils % 1.0 0.0 - 2.0 %    Neutrophils Absolute 3.79 1.20 - 7.70 x10*3/uL    Immature Granulocytes Absolute, Automated 0.05 0.00 - 0.70 x10*3/uL    Lymphocytes Absolute 1.46 1.20 - 4.80 x10*3/uL    Monocytes Absolute 0.46 0.10 - 1.00 x10*3/uL    Eosinophils  Absolute 0.43 0.00 - 0.70 x10*3/uL    Basophils Absolute 0.06 0.00 - 0.10 x10*3/uL

## 2024-04-04 NOTE — PREPROCEDURE INSTRUCTIONS
Medication List            Accurate as of April 4, 2024 10:23 AM. Always use your most recent med list.                cholecalciferol 25 MCG (1000 UT) tablet  Commonly known as: Vitamin D-3  Take 2 tablets (50 mcg) by mouth once daily. At Night  Medication Adjustments for Surgery: Stop 7 days before surgery     cyanocobalamin 1,000 mcg tablet  Commonly known as: Vitamin B-12  Medication Adjustments for Surgery: Take morning of surgery with sip of water, no other fluids     irbesartan 150 mg tablet  Commonly known as: Avapro  Take 1 tablet (150 mg) by mouth once daily. At Bedtime  Medication Adjustments for Surgery: Stop 1 day before surgery     simvastatin 20 mg tablet  Commonly known as: Zocor  Take 1 tablet (20 mg) by mouth once daily at bedtime.  Medication Adjustments for Surgery: Continue until night before surgery              NPO Instructions:    Do not eat any food after midnight the night before your surgery/procedure.    Additional Instructions:     Seven/Six Days before Surgery:  Review your medication instructions, stop indicated medications  Five Days before Surgery:  Review your medication instructions, stop indicated medications  Three Days before Surgery:  Review your medication instructions, stop indicated medications  The Day before Surgery:  Review your medication instructions, stop indicated medications  You will be contacted regarding the time of your arrival to facility and surgery time  Do not eat any food after Midnight  Day of Surgery:  Review your medication instructions, take indicated medications  Wear  comfortable loose fitting clothing  Do not use moisturizers, creams, lotions or perfume  All jewelry and valuables should be left at home    CONTACT SURGEON'S OFFICE IF YOU DEVELOP:  * Fever = 100.4 F   * New respiratory symptoms (e.g. cough, shortness of breath, respiratory distress, sore throat)  * Recent loss of taste or smell  *Flu like symptoms such as headache, fatigue or  gastrointestinal symptoms  * You develop any open sores, shingles, burning or painful urination   AND/OR:  * You no longer wish to have the surgery.  * Any other personal circumstances change that may lead to the need to cancel or defer this surgery.  *You were admitted to any hospital within one week of your planned procedure.    SMOKING:  *Quitting smoking can make a huge difference to your health and recovery from surgery.    *If you need help with quitting, call [phone]-NOW.    THE DAY BEFORE SURGERY:  *Do not eat any food after midnight the night before your surgery.     SURGICAL TIME:  *You will be contacted between 2 p.m. and 3 p.m. the business day before your surgery with your arrival time.  *If you haven't received a call by 3pm, call [phone]  *Scheduled surgery times may change and you will be notified if this occurs-check your personal voicemail for any updates.    ON THE MORNING OF SURGERY:  *Wear comfortable, loose fitting clothing.   *Do not use moisturizers, creams, lotions or perfume.  *All jewelry and valuables should be left at home.  *Prosthetic devices such as contact lenses, hearing aids, dentures, eyelash extensions, hairpins and body piercing must be removed before surgery.    BRING WITH YOU:  *Photo ID and insurance card  *Current list of medications and allergies  *Pacemaker/Defibrillator/Heart stent cards  *CPAP machine and mask  *Slings/splints/crutches  *Copy of your complete Advanced Directive/DHPOA-if applicable  *Neurostimulator implant remote    PARKING AND ARRIVAL:  *Check in at the Main Entrance desk and let them know you are here for surgery.    IF YOU ARE HAVING OUTPATIENT/SAME DAY SURGERY:  *A responsible adult MUST accompany you at the time of discharge and stay with you for 24 hours after your surgery.  *You may NOT drive yourself home after surgery.  *You may use a taxi or ride sharing service ([employer], Uber) to return home ONLY if you are accompanied by a friend or  family member.  *Instructions for resuming your medications will be provided by your surgeon.    Thank you for coming to Pre Admission testing.     If I have prescribe medication please don't forget to  at your pharmacy.     Any questions about today's visit call [phone] and leave a message in the general mailbox.    Patient instructed to ambulate as soon as possible postoperatively to decrease thromboembolic risk.    Minerva Barrett, APRN-CNP

## 2024-04-10 ENCOUNTER — ANESTHESIA EVENT (OUTPATIENT)
Dept: OPERATING ROOM | Facility: HOSPITAL | Age: 64
End: 2024-04-10
Payer: COMMERCIAL

## 2024-04-11 ENCOUNTER — ANESTHESIA (OUTPATIENT)
Dept: OPERATING ROOM | Facility: HOSPITAL | Age: 64
End: 2024-04-11
Payer: COMMERCIAL

## 2024-04-11 ENCOUNTER — HOSPITAL ENCOUNTER (OUTPATIENT)
Facility: HOSPITAL | Age: 64
Setting detail: OUTPATIENT SURGERY
Discharge: HOME | End: 2024-04-11
Attending: ORTHOPAEDIC SURGERY | Admitting: ORTHOPAEDIC SURGERY
Payer: COMMERCIAL

## 2024-04-11 VITALS
WEIGHT: 242.95 LBS | TEMPERATURE: 97.5 F | HEIGHT: 72 IN | SYSTOLIC BLOOD PRESSURE: 133 MMHG | RESPIRATION RATE: 18 BRPM | DIASTOLIC BLOOD PRESSURE: 84 MMHG | OXYGEN SATURATION: 94 % | BODY MASS INDEX: 32.91 KG/M2 | HEART RATE: 72 BPM

## 2024-04-11 DIAGNOSIS — M75.121 COMPLETE TEAR OF RIGHT ROTATOR CUFF, UNSPECIFIED WHETHER TRAUMATIC: ICD-10-CM

## 2024-04-11 DIAGNOSIS — M75.121 NONTRAUMATIC COMPLETE TEAR OF RIGHT ROTATOR CUFF: Primary | ICD-10-CM

## 2024-04-11 PROCEDURE — 7100000010 HC PHASE TWO TIME - EACH INCREMENTAL 1 MINUTE: Performed by: ORTHOPAEDIC SURGERY

## 2024-04-11 PROCEDURE — 7100000009 HC PHASE TWO TIME - INITIAL BASE CHARGE: Performed by: ORTHOPAEDIC SURGERY

## 2024-04-11 PROCEDURE — 2500000004 HC RX 250 GENERAL PHARMACY W/ HCPCS (ALT 636 FOR OP/ED): Mod: JZ | Performed by: PHARMACIST

## 2024-04-11 PROCEDURE — 2780000003 HC OR 278 NO HCPCS: Performed by: ORTHOPAEDIC SURGERY

## 2024-04-11 PROCEDURE — A4649 SURGICAL SUPPLIES: HCPCS | Performed by: ORTHOPAEDIC SURGERY

## 2024-04-11 PROCEDURE — 3600000004 HC OR TIME - INITIAL BASE CHARGE - PROCEDURE LEVEL FOUR: Performed by: ORTHOPAEDIC SURGERY

## 2024-04-11 PROCEDURE — A29827 PR SHLDR ARTHROSCOP,SURG,W/ROTAT CUFF REPR: Performed by: ANESTHESIOLOGIST ASSISTANT

## 2024-04-11 PROCEDURE — C1713 ANCHOR/SCREW BN/BN,TIS/BN: HCPCS | Performed by: ORTHOPAEDIC SURGERY

## 2024-04-11 PROCEDURE — 7100000002 HC RECOVERY ROOM TIME - EACH INCREMENTAL 1 MINUTE: Performed by: ORTHOPAEDIC SURGERY

## 2024-04-11 PROCEDURE — 2500000004 HC RX 250 GENERAL PHARMACY W/ HCPCS (ALT 636 FOR OP/ED): Performed by: STUDENT IN AN ORGANIZED HEALTH CARE EDUCATION/TRAINING PROGRAM

## 2024-04-11 PROCEDURE — 2500000004 HC RX 250 GENERAL PHARMACY W/ HCPCS (ALT 636 FOR OP/ED): Performed by: ORTHOPAEDIC SURGERY

## 2024-04-11 PROCEDURE — 29827 SHO ARTHRS SRG RT8TR CUF RPR: CPT | Performed by: ORTHOPAEDIC SURGERY

## 2024-04-11 PROCEDURE — 2720000007 HC OR 272 NO HCPCS: Performed by: ORTHOPAEDIC SURGERY

## 2024-04-11 PROCEDURE — 2500000004 HC RX 250 GENERAL PHARMACY W/ HCPCS (ALT 636 FOR OP/ED): Performed by: ANESTHESIOLOGIST ASSISTANT

## 2024-04-11 PROCEDURE — 3700000002 HC GENERAL ANESTHESIA TIME - EACH INCREMENTAL 1 MINUTE: Performed by: ORTHOPAEDIC SURGERY

## 2024-04-11 PROCEDURE — 3600000009 HC OR TIME - EACH INCREMENTAL 1 MINUTE - PROCEDURE LEVEL FOUR: Performed by: ORTHOPAEDIC SURGERY

## 2024-04-11 PROCEDURE — 2500000005 HC RX 250 GENERAL PHARMACY W/O HCPCS: Performed by: ANESTHESIOLOGIST ASSISTANT

## 2024-04-11 PROCEDURE — A29827 PR SHLDR ARTHROSCOP,SURG,W/ROTAT CUFF REPR: Performed by: STUDENT IN AN ORGANIZED HEALTH CARE EDUCATION/TRAINING PROGRAM

## 2024-04-11 PROCEDURE — 7100000001 HC RECOVERY ROOM TIME - INITIAL BASE CHARGE: Performed by: ORTHOPAEDIC SURGERY

## 2024-04-11 PROCEDURE — 64415 NJX AA&/STRD BRCH PLXS IMG: CPT | Performed by: STUDENT IN AN ORGANIZED HEALTH CARE EDUCATION/TRAINING PROGRAM

## 2024-04-11 PROCEDURE — 3700000001 HC GENERAL ANESTHESIA TIME - INITIAL BASE CHARGE: Performed by: ORTHOPAEDIC SURGERY

## 2024-04-11 DEVICE — SUTURE ANCHOR, Y-KNOT PRO RC, TWO P2 HI-FI: Type: IMPLANTABLE DEVICE | Site: SHOULDER | Status: FUNCTIONAL

## 2024-04-11 RX ORDER — MEPERIDINE HYDROCHLORIDE 25 MG/ML
12.5 INJECTION INTRAMUSCULAR; INTRAVENOUS; SUBCUTANEOUS EVERY 10 MIN PRN
Status: DISCONTINUED | OUTPATIENT
Start: 2024-04-11 | End: 2024-04-11 | Stop reason: HOSPADM

## 2024-04-11 RX ORDER — SODIUM CHLORIDE, SODIUM LACTATE, POTASSIUM CHLORIDE, CALCIUM CHLORIDE 600; 310; 30; 20 MG/100ML; MG/100ML; MG/100ML; MG/100ML
100 INJECTION, SOLUTION INTRAVENOUS CONTINUOUS
Status: DISCONTINUED | OUTPATIENT
Start: 2024-04-11 | End: 2024-04-11 | Stop reason: HOSPADM

## 2024-04-11 RX ORDER — FENTANYL CITRATE 50 UG/ML
50 INJECTION, SOLUTION INTRAMUSCULAR; INTRAVENOUS ONCE
Status: COMPLETED | OUTPATIENT
Start: 2024-04-11 | End: 2024-04-11

## 2024-04-11 RX ORDER — ROCURONIUM BROMIDE 10 MG/ML
INJECTION, SOLUTION INTRAVENOUS AS NEEDED
Status: DISCONTINUED | OUTPATIENT
Start: 2024-04-11 | End: 2024-04-11

## 2024-04-11 RX ORDER — ONDANSETRON HYDROCHLORIDE 2 MG/ML
4 INJECTION, SOLUTION INTRAVENOUS ONCE AS NEEDED
Status: DISCONTINUED | OUTPATIENT
Start: 2024-04-11 | End: 2024-04-11 | Stop reason: HOSPADM

## 2024-04-11 RX ORDER — PROPOFOL 10 MG/ML
INJECTION, EMULSION INTRAVENOUS AS NEEDED
Status: DISCONTINUED | OUTPATIENT
Start: 2024-04-11 | End: 2024-04-11

## 2024-04-11 RX ORDER — ALBUTEROL SULFATE 0.83 MG/ML
2.5 SOLUTION RESPIRATORY (INHALATION) EVERY 30 MIN PRN
Status: DISCONTINUED | OUTPATIENT
Start: 2024-04-11 | End: 2024-04-11 | Stop reason: HOSPADM

## 2024-04-11 RX ORDER — SODIUM CHLORIDE, SODIUM LACTATE, POTASSIUM CHLORIDE, CALCIUM CHLORIDE 600; 310; 30; 20 MG/100ML; MG/100ML; MG/100ML; MG/100ML
40 INJECTION, SOLUTION INTRAVENOUS CONTINUOUS
Status: DISCONTINUED | OUTPATIENT
Start: 2024-04-11 | End: 2024-04-11 | Stop reason: HOSPADM

## 2024-04-11 RX ORDER — CEFAZOLIN 1 G/1
2 INJECTION, POWDER, FOR SOLUTION INTRAVENOUS ONCE
Status: DISCONTINUED | OUTPATIENT
Start: 2024-04-11 | End: 2024-04-11

## 2024-04-11 RX ORDER — LIDOCAINE HYDROCHLORIDE 10 MG/ML
INJECTION, SOLUTION EPIDURAL; INFILTRATION; INTRACAUDAL; PERINEURAL AS NEEDED
Status: DISCONTINUED | OUTPATIENT
Start: 2024-04-11 | End: 2024-04-11

## 2024-04-11 RX ORDER — FENTANYL CITRATE 50 UG/ML
50 INJECTION, SOLUTION INTRAMUSCULAR; INTRAVENOUS EVERY 5 MIN PRN
Status: DISCONTINUED | OUTPATIENT
Start: 2024-04-11 | End: 2024-04-11 | Stop reason: HOSPADM

## 2024-04-11 RX ORDER — MIDAZOLAM HYDROCHLORIDE 1 MG/ML
2 INJECTION, SOLUTION INTRAMUSCULAR; INTRAVENOUS ONCE
Status: COMPLETED | OUTPATIENT
Start: 2024-04-11 | End: 2024-04-11

## 2024-04-11 RX ORDER — FENTANYL CITRATE 50 UG/ML
INJECTION, SOLUTION INTRAMUSCULAR; INTRAVENOUS AS NEEDED
Status: DISCONTINUED | OUTPATIENT
Start: 2024-04-11 | End: 2024-04-11

## 2024-04-11 RX ORDER — CEFAZOLIN SODIUM 2 G/100ML
2 INJECTION, SOLUTION INTRAVENOUS
Status: COMPLETED | OUTPATIENT
Start: 2024-04-11 | End: 2024-04-11

## 2024-04-11 RX ORDER — LABETALOL HYDROCHLORIDE 5 MG/ML
5 INJECTION, SOLUTION INTRAVENOUS EVERY 5 MIN PRN
Status: DISCONTINUED | OUTPATIENT
Start: 2024-04-11 | End: 2024-04-11 | Stop reason: HOSPADM

## 2024-04-11 RX ORDER — ONDANSETRON HYDROCHLORIDE 2 MG/ML
INJECTION, SOLUTION INTRAVENOUS AS NEEDED
Status: DISCONTINUED | OUTPATIENT
Start: 2024-04-11 | End: 2024-04-11

## 2024-04-11 RX ORDER — OXYCODONE AND ACETAMINOPHEN 5; 325 MG/1; MG/1
1 TABLET ORAL EVERY 4 HOURS PRN
Qty: 42 TABLET | Refills: 0 | Status: SHIPPED | OUTPATIENT
Start: 2024-04-11 | End: 2024-04-18

## 2024-04-11 RX ORDER — IPRATROPIUM BROMIDE 0.5 MG/2.5ML
500 SOLUTION RESPIRATORY (INHALATION) EVERY 30 MIN PRN
Status: DISCONTINUED | OUTPATIENT
Start: 2024-04-11 | End: 2024-04-11 | Stop reason: HOSPADM

## 2024-04-11 RX ORDER — PHENYLEPHRINE HCL IN 0.9% NACL 0.4MG/10ML
SYRINGE (ML) INTRAVENOUS AS NEEDED
Status: DISCONTINUED | OUTPATIENT
Start: 2024-04-11 | End: 2024-04-11

## 2024-04-11 RX ADMIN — LIDOCAINE HYDROCHLORIDE 5 ML: 10 INJECTION, SOLUTION EPIDURAL; INFILTRATION; INTRACAUDAL; PERINEURAL at 08:08

## 2024-04-11 RX ADMIN — ROCURONIUM BROMIDE 50 MG: 10 INJECTION, SOLUTION INTRAVENOUS at 08:08

## 2024-04-11 RX ADMIN — SUGAMMADEX 200 MG: 100 INJECTION, SOLUTION INTRAVENOUS at 09:30

## 2024-04-11 RX ADMIN — MIDAZOLAM HYDROCHLORIDE 2 MG: 1 INJECTION, SOLUTION INTRAMUSCULAR; INTRAVENOUS at 07:56

## 2024-04-11 RX ADMIN — Medication 100 MCG: at 08:23

## 2024-04-11 RX ADMIN — CEFAZOLIN SODIUM 2 G: 2 INJECTION, SOLUTION INTRAVENOUS at 08:17

## 2024-04-11 RX ADMIN — ONDANSETRON 4 MG: 2 INJECTION INTRAMUSCULAR; INTRAVENOUS at 08:27

## 2024-04-11 RX ADMIN — FENTANYL CITRATE 50 MCG: 50 INJECTION INTRAMUSCULAR; INTRAVENOUS at 09:02

## 2024-04-11 RX ADMIN — FENTANYL CITRATE 50 MCG: 50 INJECTION INTRAMUSCULAR; INTRAVENOUS at 07:56

## 2024-04-11 RX ADMIN — SODIUM CHLORIDE, SODIUM LACTATE, POTASSIUM CHLORIDE, AND CALCIUM CHLORIDE 100 ML/HR: 600; 310; 30; 20 INJECTION, SOLUTION INTRAVENOUS at 07:31

## 2024-04-11 RX ADMIN — FENTANYL CITRATE 50 MCG: 50 INJECTION INTRAMUSCULAR; INTRAVENOUS at 08:08

## 2024-04-11 RX ADMIN — PROPOFOL 200 MG: 10 INJECTION, EMULSION INTRAVENOUS at 08:08

## 2024-04-11 RX ADMIN — ROCURONIUM BROMIDE 10 MG: 10 INJECTION, SOLUTION INTRAVENOUS at 09:07

## 2024-04-11 RX ADMIN — LIDOCAINE HYDROCHLORIDE 5 ML: 10 INJECTION, SOLUTION EPIDURAL; INFILTRATION; INTRACAUDAL; PERINEURAL at 08:34

## 2024-04-11 SDOH — HEALTH STABILITY: MENTAL HEALTH: CURRENT SMOKER: 0

## 2024-04-11 ASSESSMENT — PAIN SCALES - GENERAL
PAINLEVEL_OUTOF10: 0 - NO PAIN
PAIN_LEVEL: 2
PAINLEVEL_OUTOF10: 3
PAINLEVEL_OUTOF10: 0 - NO PAIN
PAINLEVEL_OUTOF10: 0 - NO PAIN

## 2024-04-11 ASSESSMENT — PAIN - FUNCTIONAL ASSESSMENT
PAIN_FUNCTIONAL_ASSESSMENT: 0-10

## 2024-04-11 ASSESSMENT — COLUMBIA-SUICIDE SEVERITY RATING SCALE - C-SSRS
6. HAVE YOU EVER DONE ANYTHING, STARTED TO DO ANYTHING, OR PREPARED TO DO ANYTHING TO END YOUR LIFE?: NO
1. IN THE PAST MONTH, HAVE YOU WISHED YOU WERE DEAD OR WISHED YOU COULD GO TO SLEEP AND NOT WAKE UP?: NO
2. HAVE YOU ACTUALLY HAD ANY THOUGHTS OF KILLING YOURSELF?: NO

## 2024-04-11 NOTE — BRIEF OP NOTE
Date: 2024  OR Location: [facility]    Name: Van Hayes : 1960, Age: 63 y.o., MRN: [id number], Sex: male    Diagnosis  Pre-op Diagnosis     * Complete tear of right rotator cuff, unspecified whether traumatic [M75.121] Post-op Diagnosis     * Complete tear of right rotator cuff, unspecified whether traumatic [M75.121]     Procedures  RIGHT SHOULDER SCOPE ROTATORCUFF REPAIR  37685 - [place] SURGICAL ARTHROSCOPY SHOULDER W/ROTATOR CUFF RPR      Surgeons      * Santhosh Arvizu - Primary    Resident/Fellow/Other Assistant:  Sanjay Niño MD    Procedure Summary  Anesthesia: General  ASA: III  Anesthesia Staff: Anesthesiologist: Amrik Ramirez DO  C-AA: ADONIS Taylor  Estimated Blood Loss: 10 mL  Intra-op Medications:   Administrations occurring from 0830 to 1000 on 24:   Medication Name Total Dose   lactated Ringer's infusion Cannot be calculated              Anesthesia Record               Intraprocedure I/O Totals          Intake    lactated Ringer's infusion 450.00 mL    Total Intake 450 mL       Output    Est. Blood Loss 5 mL    Total Output 5 mL       Net    Net Volume 445 mL          Specimen: No specimens collected     Staff:   Circulator: Kendra Tineo RN  Relief Circulator: Sheila Jimenez RN  Scrub Person: Blossom Patel; Marilu Shepard    Findings: See full operative report    Complications:  None; patient tolerated the procedure well.     Disposition: PACU - hemodynamically stable.  Condition: stable  Specimens Collected: No specimens collected

## 2024-04-11 NOTE — ANESTHESIA PREPROCEDURE EVALUATION
Patient: Van Hayes    Procedure Information       Date/Time: 04/11/24 0830    Procedure: RIGHT SHOULDER SCOPE ROTLINVATECATORCUFF REPAIR ) (Right: Shoulder)    Location: [facility] / [facility]    Surgeons: Santhosh Arvizu MD          Past Medical History:   Diagnosis Date    Hyperlipidemia     Hypertension     Melanoma (CMS/HCC)     Personal history of other endocrine, nutritional and metabolic disease 06/01/2021    History of familial combined hyperlipidemia    Pre-diabetes      Past Surgical History:   Procedure Laterality Date    APPENDECTOMY      CATARACT EXTRACTION, BILATERAL Bilateral     COLONOSCOPY      OTHER SURGICAL HISTORY      melanoma resected from left lower leg       Relevant Problems   Anesthesia (within normal limits)      Cardiac   (+) Benign essential HTN   (+) Hyperlipidemia      Endocrine   (+) Obesity      Musculoskeletal   (+) Right knee DJD      ID   (+) Tinea pedis      Skin   (+) Generalized skin eruption due to drugs and medicaments taken internally   (+) Rash and other nonspecific skin eruption       Clinical information reviewed:   Tobacco  Allergies  Meds   Med Hx  Surg Hx   Fam Hx  Soc Hx        NPO Detail:  NPO/Void Status  Carbohydrate Drink Given Prior to Surgery? : N  Date of Last Liquid: 04/10/24  Time of Last Liquid: 2030  Date of Last Solid: 04/10/24  Time of Last Solid: 2030  Last Intake Type: Clear fluids  Time of Last Void: 0600         Physical Exam    Airway  Mallampati: II  TM distance: >3 FB  Neck ROM: full     Cardiovascular    Dental    Pulmonary    Abdominal            Anesthesia Plan    History of general anesthesia?: yes  History of complications of general anesthesia?: no    ASA 3     general and regional     The patient is not a current smoker.  Patient was not previously instructed to abstain from smoking on day of procedure.  Patient did not smoke on day of procedure.  Education provided regarding risk of obstructive sleep apnea.  intravenous  induction   Postoperative administration of opioids is intended.  Anesthetic plan and risks discussed with patient.  Use of blood products discussed with patient who consented to blood products.    Plan discussed with CRNA and CAA.

## 2024-04-11 NOTE — ANESTHESIA POSTPROCEDURE EVALUATION
Patient: Van Hayes    Procedure Summary       Date: 04/11/24 Room / Location: [facility]  / [facility]    Anesthesia Start: 0808 Anesthesia Stop: 0939    Procedure: RIGHT SHOULDER SCOPE ROTATORCUFF REPAIR (Right: Shoulder) Diagnosis:       Complete tear of right rotator cuff, unspecified whether traumatic      (Complete tear of right rotator cuff, unspecified whether traumatic [M75.121])    Surgeons: Santhosh Arvizu MD Responsible Provider: Amrik Ramirez DO    Anesthesia Type: general, regional ASA Status: 3            Anesthesia Type: general, regional    Vitals Value Taken Time   /84 04/11/24 0939   Temp 36 04/11/24 0939   Pulse 65 04/11/24 0939   Resp 18 04/11/24 0939   SpO2 97 04/11/24 0939       Anesthesia Post Evaluation    Patient location during evaluation: PACU  Patient participation: complete - patient participated  Level of consciousness: awake and alert  Pain score: 2  Pain management: adequate  Airway patency: patent  Cardiovascular status: acceptable  Respiratory status: acceptable  Hydration status: acceptable  Postoperative Nausea and Vomiting: none      No notable events documented.

## 2024-04-11 NOTE — PERIOPERATIVE NURSING NOTE
2 + RADIAL ON RIGHT Hand warm to touch with capillary refill less than 2 seconds + movement and sensation to all digits on right hand

## 2024-04-11 NOTE — ANESTHESIA PROCEDURE NOTES
Peripheral Block    Patient location during procedure: pre-op  Start time: 4/11/2024 7:59 AM  End time: 4/11/2024 8:00 AM  Reason for block: at surgeon's request and post-op pain management  Staffing  Performed: attending   Authorized by: Amrik Ramirez DO    Performed by: Amrik Ramirez DO  Preanesthetic Checklist  Completed: patient identified, IV checked, site marked, risks and benefits discussed, surgical consent, monitors and equipment checked, pre-op evaluation and timeout performed   Timeout performed at: 4/11/2024 7:55 AM  Peripheral Block  Patient position: sitting  Prep: ChloraPrep  Patient monitoring: heart rate, cardiac monitor and continuous pulse ox  Block type: interscalene  Laterality: right  Injection technique: single-shot  Guidance: ultrasound guided  Local infiltration: ropivacaine  Infiltration strength: 0.5 %  Dose: 20 mL  Needle  Needle gauge: 22 G  Needle length: 5 cm  Needle localization: ultrasound guidance  Assessment  Injection assessment: negative aspiration for heme, no paresthesia on injection, incremental injection and local visualized surrounding nerve on ultrasound  Paresthesia pain: none  Heart rate change: no  Slow fractionated injection: yes  Additional Notes  With 4mg Decadron

## 2024-04-11 NOTE — H&P
History Of Present Illness  Van Hayes is a 63 y.o. male presenting with large tear of the rotator cuff right shoulder.     Past Medical History  Past Medical History:   Diagnosis Date    Hyperlipidemia     Hypertension     Melanoma (CMS/HCC)     Personal history of other endocrine, nutritional and metabolic disease 06/01/2021    History of familial combined hyperlipidemia    Pre-diabetes        Surgical History  Past Surgical History:   Procedure Laterality Date    APPENDECTOMY      CATARACT EXTRACTION, BILATERAL Bilateral     COLONOSCOPY      OTHER SURGICAL HISTORY      melanoma resected from left lower leg        Social History  He reports that he has never smoked. He has never used smokeless tobacco. He reports current alcohol use. He reports that he does not use drugs.    Family History  Family History   Problem Relation Name Age of Onset    Heart attack Father      Other (Abdominal aortic artery aneurysm (__cm)) Father      Other (Bile duct cancer) Father      Colon cancer Father      Coronary artery disease Father      Prostate cancer Father      Other (S/P CABG) Father          Allergies  Pollen extracts    Review of Systems     Physical Exam     Last Recorded Vitals  There were no vitals taken for this visit.    Relevant Results        MRI scan confirms large rotator cuff tear     Assessment/Plan   Principal Problem:    Complete tear of right rotator cuff      Plan is for arthroscopy and rotator cuff repair right shoulder       I spent 15 minutes in the professional and overall care of this patient.      Santhosh Arvizu MD

## 2024-04-11 NOTE — ANESTHESIA PROCEDURE NOTES
Airway  Date/Time: 4/11/2024 8:13 AM  Urgency: elective    Airway not difficult    Staffing  Performed: ADONIS   Authorized by: Amrik Ramirez DO    Performed by: ADONIS Taylor  Patient location during procedure: OR    Indications and Patient Condition  Indications for airway management: anesthesia  Spontaneous Ventilation: absent  Preoxygenated: yes  Patient position: sniffing  Mask difficulty assessment: 1 - vent by mask    Final Airway Details  Final airway type: endotracheal airway      Successful airway: ETT  Cuffed: yes   Successful intubation technique: direct laryngoscopy  Facilitating devices/methods: intubating stylet  Blade: Lionel  Blade size: #3  ETT size (mm): 8.0  Cormack-Lehane Classification: grade I - full view of glottis  Placement verified by: capnometry   Inital cuff pressure (cm H2O): 23  Measured from: lips  Number of attempts at approach: 1

## 2024-04-15 ENCOUNTER — TELEPHONE (OUTPATIENT)
Dept: ORTHOPEDIC SURGERY | Facility: HOSPITAL | Age: 64
End: 2024-04-15
Payer: COMMERCIAL

## 2024-04-15 NOTE — TELEPHONE ENCOUNTER
Informed that patient wife, Naila called with concerns, all his bandages are off and he's not wearing the sling. She would like a call to advise what they should do until his post op appt on 4/17/2024 with Roslyn.  I reviewed with Dr Arvizu, patient to keep incision clean and dry, stressed importance of wearing sling at all times, can move elbow wrist and hand.  Theyunderstood and will call with any more questions.  DOS 4/11/2024 Right rotator cuff repair    From surgery AVS:

## 2024-04-17 ENCOUNTER — OFFICE VISIT (OUTPATIENT)
Dept: OPHTHALMOLOGY | Facility: CLINIC | Age: 64
End: 2024-04-17
Payer: COMMERCIAL

## 2024-04-17 ENCOUNTER — OFFICE VISIT (OUTPATIENT)
Dept: ORTHOPEDIC SURGERY | Facility: HOSPITAL | Age: 64
End: 2024-04-17
Payer: COMMERCIAL

## 2024-04-17 VITALS — WEIGHT: 240 LBS | BODY MASS INDEX: 32.51 KG/M2 | HEIGHT: 72 IN

## 2024-04-17 DIAGNOSIS — H35.373 EPIRETINAL MEMBRANE (ERM) OF BOTH EYES: ICD-10-CM

## 2024-04-17 DIAGNOSIS — H43.813 PVD (POSTERIOR VITREOUS DETACHMENT), BOTH EYES: ICD-10-CM

## 2024-04-17 DIAGNOSIS — H40.003 GLAUCOMA SUSPECT OF BOTH EYES: ICD-10-CM

## 2024-04-17 DIAGNOSIS — S46.011D TRAUMATIC COMPLETE TEAR OF RIGHT ROTATOR CUFF, SUBSEQUENT ENCOUNTER: ICD-10-CM

## 2024-04-17 DIAGNOSIS — H35.342 LAMELLAR MACULAR HOLE, LEFT: Primary | ICD-10-CM

## 2024-04-17 DIAGNOSIS — Z96.1 PSEUDOPHAKIA: ICD-10-CM

## 2024-04-17 PROCEDURE — 99213 OFFICE O/P EST LOW 20 MIN: CPT

## 2024-04-17 PROCEDURE — 1036F TOBACCO NON-USER: CPT | Performed by: PHYSICIAN ASSISTANT

## 2024-04-17 PROCEDURE — 99024 POSTOP FOLLOW-UP VISIT: CPT | Performed by: PHYSICIAN ASSISTANT

## 2024-04-17 PROCEDURE — L3670 SO ACRO/CLAV CAN WEB PRE OTS: HCPCS | Performed by: PHYSICIAN ASSISTANT

## 2024-04-17 PROCEDURE — 92134 CPTRZ OPH DX IMG PST SGM RTA: CPT | Mod: BILATERAL PROCEDURE

## 2024-04-17 ASSESSMENT — ENCOUNTER SYMPTOMS
NEUROLOGICAL NEGATIVE: 0
MUSCULOSKELETAL NEGATIVE: 0
ALLERGIC/IMMUNOLOGIC NEGATIVE: 0
EYES NEGATIVE: 1
CONSTITUTIONAL NEGATIVE: 0
ENDOCRINE NEGATIVE: 0
RESPIRATORY NEGATIVE: 0
PSYCHIATRIC NEGATIVE: 0
HEMATOLOGIC/LYMPHATIC NEGATIVE: 0
GASTROINTESTINAL NEGATIVE: 0
CARDIOVASCULAR NEGATIVE: 0

## 2024-04-17 ASSESSMENT — CONF VISUAL FIELD
OD_SUPERIOR_NASAL_RESTRICTION: 0
OD_NORMAL: 1
OD_SUPERIOR_TEMPORAL_RESTRICTION: 0
OS_SUPERIOR_TEMPORAL_RESTRICTION: 0
OS_INFERIOR_NASAL_RESTRICTION: 0
OD_INFERIOR_TEMPORAL_RESTRICTION: 0
OS_NORMAL: 1
OS_INFERIOR_TEMPORAL_RESTRICTION: 0
OS_SUPERIOR_NASAL_RESTRICTION: 0
OD_INFERIOR_NASAL_RESTRICTION: 0

## 2024-04-17 ASSESSMENT — EXTERNAL EXAM - LEFT EYE: OS_EXAM: NORMAL

## 2024-04-17 ASSESSMENT — PAIN - FUNCTIONAL ASSESSMENT: PAIN_FUNCTIONAL_ASSESSMENT: 0-10

## 2024-04-17 ASSESSMENT — PAIN SCALES - GENERAL: PAINLEVEL_OUTOF10: 1

## 2024-04-17 ASSESSMENT — CUP TO DISC RATIO
OD_RATIO: 0.65
OS_RATIO: 0.65

## 2024-04-17 ASSESSMENT — TONOMETRY
OD_IOP_MMHG: 14
OS_IOP_MMHG: 14
IOP_METHOD: TONOPEN

## 2024-04-17 ASSESSMENT — PAIN DESCRIPTION - DESCRIPTORS: DESCRIPTORS: DULL

## 2024-04-17 ASSESSMENT — EXTERNAL EXAM - RIGHT EYE: OD_EXAM: NORMAL

## 2024-04-17 ASSESSMENT — VISUAL ACUITY
METHOD: SNELLEN - LINEAR
OS_PH_SC: 20/25
OS_SC+: -2
OD_SC: 20/20
OS_SC: 20/40

## 2024-04-17 NOTE — PROGRESS NOTES
Patient presents to clinic today for first postoperative visit after right shoulder rotator cuff repair performed on 4/11/2024.  Overall he is doing well with well-managed pain.  He does not have any formal physical therapy set up yet at this time.    Examination: Surgical incisions are healing well no surrounding erythema active drainage or sign of active infection moderate anterior ecchymosis and sensation intact  strength 5/5 palpable distal radial pulse.    Impression: Right shoulder rotator cuff tear    Plan: We discussed intraoperative findings and pictures in detail today.  He should continue with sling usage for the next 5 weeks.  Given the size of his tear we do recommend delaying the start of physical therapy for another 2 weeks however he will call to set it up at this time.  He may shower and get his incisions wet.  We discussed elbow and hand range of motion however he should not begin with any shoulder range of motion at this time.  He may take over-the-counter pain medication as tolerated.  Will return to our office in 3 weeks.  We have provided him with a better more comfortable sling today.    Patient was prescribed a cool sling for rotator cuff tear. The patient has weakness, instability and/or deformity of their right shoulder which requires stabilization from this orthosis to improve their function.      Verbal and written instructions for the use, wear schedule, cleaning and application of this item were given.  Patient was instructed that should the brace result in increased pain, decreased sensation, increased swelling, or an overall worsening of their medical condition, to please contact our office immediately.     Orthotic management and training was provided for skin care, modifications due to healing tissues, edema changes, interruption in skin integrity, and safety precautions with the orthosis.    Roslyn Torres PA-C  Department of Orthopaedic Surgery  [facility]  [facility]    Dictation performed with the use of voice recognition software. Syntax and grammatical errors may exist.

## 2024-04-17 NOTE — PROGRESS NOTES
Epiretinal membrane (ERM) of both eyes  Lamellar hole, left eye  - Right eye epiretinal membrane (ERM) not visually significant  - Left eye lamellar hole with ERM and LHEP and , likely somewhat visually significant but still with excellent visual acuity, corrected to 20/25 with pinhole    OCT : 04/17/24   OD: Normal Foveal Contour & Retinal laminations, tr epiretinal membrane (ERM), EZ line preserved, (-) IRF/subretinal fluid (SRF)  OS: mild epiretinal membrane (ERM) with lamellar hole and small IRF, EZ line preserved    Plan  Observe  RTC in 6m    PVD (posterior vitreous detachment), both eyes  -No retinal tear or detachment seen on exam. Retinal detachment symptoms discussed.     Pseudophakia  Glaucoma suspect of both eyes  - (+)FH glaucoma - mother  - Increased cup to disc ratio  - Continue fu with Dr. Adams    [id number]  -May be related to long history of RGP wear. Can consider referral to oculoplastic surgeon in the future.

## 2024-05-08 ENCOUNTER — OFFICE VISIT (OUTPATIENT)
Dept: ORTHOPEDIC SURGERY | Facility: HOSPITAL | Age: 64
End: 2024-05-08
Payer: COMMERCIAL

## 2024-05-08 ENCOUNTER — TELEPHONE (OUTPATIENT)
Dept: PRIMARY CARE | Facility: CLINIC | Age: 64
End: 2024-05-08

## 2024-05-08 DIAGNOSIS — M25.562 CHRONIC PAIN OF BOTH KNEES: Primary | ICD-10-CM

## 2024-05-08 DIAGNOSIS — M25.561 CHRONIC PAIN OF BOTH KNEES: Primary | ICD-10-CM

## 2024-05-08 DIAGNOSIS — G89.29 CHRONIC PAIN OF BOTH KNEES: Primary | ICD-10-CM

## 2024-05-08 DIAGNOSIS — S46.011D TRAUMATIC COMPLETE TEAR OF RIGHT ROTATOR CUFF, SUBSEQUENT ENCOUNTER: Primary | ICD-10-CM

## 2024-05-08 PROCEDURE — 99024 POSTOP FOLLOW-UP VISIT: CPT | Performed by: ORTHOPAEDIC SURGERY

## 2024-05-08 RX ORDER — NABUMETONE 750 MG/1
750 TABLET, FILM COATED ORAL 2 TIMES DAILY
COMMUNITY

## 2024-05-08 NOTE — PROGRESS NOTES
Patient is almost 4 weeks status post right shoulder rotator cuff repair and lysis of adhesions with capsular release.  Patient is doing very well with regard to pain management.  He has not been wearing his sling full-time.  He is starting therapy tomorrow    Right radial pulses palpable no peripheral edema  strength 5/5 and light touch sensation intact.  Passive elevation to 95 degrees without pain.  External rotation just past neutral without pain    Rotator cuff tear and adhesive capsulitis      Patient is doing well status post right shoulder surgery.  Implored him to continue wearing the sling for 2 more week and then he can discontinue it but to be very careful with his activities after that.  He will start therapy tomorrow and has been given appropriate therapy protocol.  He will follow-up in 3 to 4 weeks.    This was dictated using voice recognition software and not corrected for grammatical or spelling errors.

## 2024-05-08 NOTE — TELEPHONE ENCOUNTER
Pt called stating he has been having issues with his left knee for a couple weeks so he scheduled with Dr. Pacheco for next week. He wanted to know if he should have an X-ray done prior to that appointment or if he should wait until after. Please advise.

## 2024-05-22 ENCOUNTER — HOSPITAL ENCOUNTER (OUTPATIENT)
Dept: RADIOLOGY | Facility: CLINIC | Age: 64
Discharge: HOME | End: 2024-05-22
Payer: COMMERCIAL

## 2024-05-22 DIAGNOSIS — M25.562 CHRONIC PAIN OF BOTH KNEES: ICD-10-CM

## 2024-05-22 DIAGNOSIS — G89.29 CHRONIC PAIN OF BOTH KNEES: ICD-10-CM

## 2024-05-22 DIAGNOSIS — M25.561 CHRONIC PAIN OF BOTH KNEES: ICD-10-CM

## 2024-05-22 PROCEDURE — 73560 X-RAY EXAM OF KNEE 1 OR 2: CPT | Mod: 50

## 2024-05-22 PROCEDURE — 73560 X-RAY EXAM OF KNEE 1 OR 2: CPT | Mod: BILATERAL PROCEDURE | Performed by: STUDENT IN AN ORGANIZED HEALTH CARE EDUCATION/TRAINING PROGRAM

## 2024-05-29 ENCOUNTER — OFFICE VISIT (OUTPATIENT)
Dept: ORTHOPEDIC SURGERY | Facility: HOSPITAL | Age: 64
End: 2024-05-29
Payer: COMMERCIAL

## 2024-05-29 DIAGNOSIS — M17.0 PRIMARY OSTEOARTHRITIS OF BOTH KNEES: Primary | ICD-10-CM

## 2024-05-29 PROCEDURE — 1036F TOBACCO NON-USER: CPT | Performed by: FAMILY MEDICINE

## 2024-05-29 PROCEDURE — 99214 OFFICE O/P EST MOD 30 MIN: CPT | Performed by: FAMILY MEDICINE

## 2024-05-29 PROCEDURE — 99204 OFFICE O/P NEW MOD 45 MIN: CPT | Performed by: FAMILY MEDICINE

## 2024-05-29 RX ORDER — NAPROXEN 500 MG/1
500 TABLET ORAL
Qty: 60 TABLET | Refills: 1 | Status: SHIPPED | OUTPATIENT
Start: 2024-05-29 | End: 2024-07-28

## 2024-05-29 ASSESSMENT — PAIN SCALES - GENERAL: PAINLEVEL_OUTOF10: 2

## 2024-05-29 ASSESSMENT — PAIN DESCRIPTION - DESCRIPTORS: DESCRIPTORS: DULL

## 2024-05-29 ASSESSMENT — PAIN - FUNCTIONAL ASSESSMENT: PAIN_FUNCTIONAL_ASSESSMENT: 0-10

## 2024-05-29 NOTE — PROGRESS NOTES
Sports Medicine Office Note    Today's Date:  05/29/2024     HPI: Van Hayes is a 63 y.o. semi-retired  who presents today for bilateral knee pain.    6/19/2018: He describes medial right knee pain and swelling for the past 2. 5-3 months. His symptoms began after playing several matches of tennis. He denies any direct injury or trauma. He has had intermittent pain to the knee while playing tennis and golf for the past 1â€“2 years. He denies remote injuries to the knee. His current pain and swelling symptoms finally improved about 3 weeks ago but did not completely resolved. Now is having anterior subpatellar knee pain with stairs. He takes occasional ibuprofen before golf. He was recently given a prescription for nabumetone by his PCP for neck pain. He has no problems with the left knee today. He has no other complaints.   We agreed to treating this conservatively at this time, although he understands he will eventually need knee replacement for his advanced arthrosis. Currently, the medial compartment has advanced changes and we will try an  knee brace to be worn daily with physical activities. I offered knee aspiration and cortisone injection today but he declined. He will let me know if he would like to try that in the future. He may also benefit from a trial of viscous supplementation. He does have prescription nabumetone that he can take as instructed. I am happy to see him back in the next month if he has persistent symptoms.     5/29/2024: He returns for bilateral knee pain, L worse than R.  2 to 3 weeks ago, he has been working in his basement on building some stairs, will including a lot of kneeling.  This has flared up his left knee pain, it is swollen but the swelling is intermittent.  Since his right knee pain 6 years ago, he has stopped playing tennis and running.  Now any weightbearing activity or stairs worsen his pain.  Pain is on the lateral aspect of the left knee  and peripatellar on the right knee.  Denies any trauma, numbness, tingling.  He has tried some Voltaren and has taken a couple pills of nabumetone that he had laying around which does help.    Physical Examination:     The Right knee is without obvious signs of acute bony deformity, swelling, erythema, ecchymosis or joint effusion. The patella is without tenderness. Apprehension is negative with medial and lateral glide. Patella crepitus is positive. Patella grind is negative. The medial joint line is nontender and without bony crepitus or step-off. The lateral joint line is nontender and without bony crepitus or step-off. Flexion & extension are full and symmetrical. Varus & valgus stress test at 0° and 30° of flexion, Lachman's, and Willy's are all negative.    The Left knee is without obvious signs of acute bony deformity, swelling, erythema, ecchymosis. Moderate joint effusion. The patella is without tenderness. Apprehension is negative with medial and lateral glide. Patella crepitus is positive. Patella grind is negative. The medial joint line is nontender and without bony crepitus or step-off. The lateral joint line is tender and without bony crepitus or step-off. Flexion & extension are full and symmetrical. Varus & valgus stress test at 0° and 30° of flexion, Lachman's, and Willy's are all negative. Gait is pain-free and tandem.    Imaging:  Radiographs of the bilateral knees were reviewed and revealed severe medial joint space narrowing and moderate patellofemoral joint space narrowing bilaterally.  The studies were reviewed by Dr. Pacheco in the office today.    === 05/22/24 ===  XR KNEE 1-2 VIEWS BILATERAL  - Impression -  Mild to moderate right knee arthrosis, most pronounced medially.  Mild left knee arthrosis.  Small suprapatellar left knee joint effusion.  Signed by: Omar Geiger 5/23/2024 5:54 PM    Problem List Items Addressed This Visit    None  Visit Diagnoses         Codes    Primary  osteoarthritis of both knees    -  Primary M17.0    Relevant Medications    naproxen (Naprosyn) 500 mg tablet    Other Relevant Orders    Referral to Physical Therapy            Assessment and Plan:     We reviewed the exam and x-ray findings and discussed the conservative and surgical treatment options. We agreed to treat his bilateral knee osteoarthritis with prescription strength naproxen twice daily as well as formal physical therapy for knee strengthening.  We did discuss extensively alternate options for his knee pain including corticosteroid injections, viscosupplementation, PRP, and joint replacement.  He would be interested in PRP plus viscosupplementation in the future if conservative management fails. Follow-up in 6 weeks to discuss possibility of injection therapy.    **This note was dictated using Dragon speech recognition software and was not corrected for spelling or grammatical errors**.    Gurpreet Zapata DO  Sports Medicine Fellow  [facility] [place]

## 2024-06-05 ENCOUNTER — APPOINTMENT (OUTPATIENT)
Dept: ORTHOPEDIC SURGERY | Facility: HOSPITAL | Age: 64
End: 2024-06-05
Payer: COMMERCIAL

## 2024-06-07 ENCOUNTER — OFFICE VISIT (OUTPATIENT)
Dept: ORTHOPEDIC SURGERY | Facility: HOSPITAL | Age: 64
End: 2024-06-07
Payer: COMMERCIAL

## 2024-06-07 DIAGNOSIS — S46.011D TRAUMATIC COMPLETE TEAR OF RIGHT ROTATOR CUFF, SUBSEQUENT ENCOUNTER: Primary | ICD-10-CM

## 2024-06-07 PROCEDURE — 99024 POSTOP FOLLOW-UP VISIT: CPT | Performed by: ORTHOPAEDIC SURGERY

## 2024-06-07 NOTE — PROGRESS NOTES
Mr. Araujo has returned he is 2 months status post rotator cuff repair and lysis of adhesions.  He still has some symptoms of mild pain and crepitus in the shoulder otherwise progressing well.    Right shoulder active range of motion elevation 160 external rotation is 50 degrees internal rotation mid iliac crest.  There is slight crepitus palpable on active motion.    Right shoulder adhesive capsulitis and rotator cuff tear    Patient is doing well.  I would like to monitor him closely and follow-up again in another month.    This was dictated using voice recognition software and not corrected for grammatical or spelling errors.

## 2024-06-14 DIAGNOSIS — E78.00 ELEVATED LDL CHOLESTEROL LEVEL: ICD-10-CM

## 2024-06-14 RX ORDER — SIMVASTATIN 20 MG/1
20 TABLET, FILM COATED ORAL NIGHTLY
Qty: 90 TABLET | Refills: 2 | Status: SHIPPED | OUTPATIENT
Start: 2024-06-14

## 2024-07-10 ENCOUNTER — OFFICE VISIT (OUTPATIENT)
Dept: ORTHOPEDIC SURGERY | Facility: HOSPITAL | Age: 64
End: 2024-07-10
Payer: COMMERCIAL

## 2024-07-10 ENCOUNTER — APPOINTMENT (OUTPATIENT)
Dept: OPHTHALMOLOGY | Facility: CLINIC | Age: 64
End: 2024-07-10
Payer: COMMERCIAL

## 2024-07-10 VITALS — HEIGHT: 72 IN | BODY MASS INDEX: 32.51 KG/M2 | WEIGHT: 240 LBS

## 2024-07-10 DIAGNOSIS — S46.011D TRAUMATIC COMPLETE TEAR OF RIGHT ROTATOR CUFF, SUBSEQUENT ENCOUNTER: Primary | ICD-10-CM

## 2024-07-10 PROCEDURE — 99024 POSTOP FOLLOW-UP VISIT: CPT | Performed by: ORTHOPAEDIC SURGERY

## 2024-07-10 PROCEDURE — 1036F TOBACCO NON-USER: CPT | Performed by: ORTHOPAEDIC SURGERY

## 2024-07-10 PROCEDURE — 99212 OFFICE O/P EST SF 10 MIN: CPT | Performed by: ORTHOPAEDIC SURGERY

## 2024-07-10 NOTE — PROGRESS NOTES
Status post repair of massive cuff tear right shoulder he is doing generally quite well.    Right shoulder elevation 170 external rotation 50 degrees internal rotation to posterior iliac crest.  Motor power abduction 4/5.    Rotator cuff tear    Patient is doing well status post rotator cuff repair he should continue to progress activities gradually and be careful with his heavier activities follow-up again in 2 months for 5-month postop assessment    This was dictated using voice recognition software and not corrected for grammatical or spelling errors.   No

## 2024-07-16 ENCOUNTER — APPOINTMENT (OUTPATIENT)
Dept: ORTHOPEDIC SURGERY | Facility: HOSPITAL | Age: 64
End: 2024-07-16
Payer: COMMERCIAL

## 2024-07-16 DIAGNOSIS — M17.0 PRIMARY OSTEOARTHRITIS OF BOTH KNEES: Primary | ICD-10-CM

## 2024-07-16 NOTE — PROGRESS NOTES
Sports Medicine Office Note    Today's Date:  07/16/2024     HPI: Van Hayes is a 63 y.o. *** who presents today for ***    Sports Medicine Office Note    Today's Date:  05/29/2024     HPI: Van Hayes is a 63 y.o. semi-retired  who presents today for bilateral knee pain.    6/19/2018: He describes medial right knee pain and swelling for the past 2. 5-3 months. His symptoms began after playing several matches of tennis. He denies any direct injury or trauma. He has had intermittent pain to the knee while playing tennis and golf for the past 1-2 years. He denies remote injuries to the knee. His current pain and swelling symptoms finally improved about 3 weeks ago but did not completely resolved. Now is having anterior subpatellar knee pain with stairs. He takes occasional ibuprofen before golf. He was recently given a prescription for nabumetone by his PCP for neck pain. He has no problems with the left knee today. He has no other complaints.   We agreed to treating this conservatively at this time, although he understands he will eventually need knee replacement for his advanced arthrosis. Currently, the medial compartment has advanced changes and we will try an  knee brace to be worn daily with physical activities. I offered knee aspiration and cortisone injection today but he declined. He will let me know if he would like to try that in the future. He may also benefit from a trial of viscous supplementation. He does have prescription nabumetone that he can take as instructed. I am happy to see him back in the next month if he has persistent symptoms.     5/29/2024: He returns for bilateral knee pain, L worse than R.  2 to 3 weeks ago, he has been working in his basement on building some stairs, will including a lot of kneeling.  This has flared up his left knee pain, it is swollen but the swelling is intermittent.  Since his right knee pain 6 years ago, he has stopped  playing tennis and running.  Now any weightbearing activity or stairs worsen his pain.  Pain is on the lateral aspect of the left knee and peripatellar on the right knee.  Denies any trauma, numbness, tingling.  He has tried some Voltaren and has taken a couple pills of nabumetone that he had laying around which does help.  We agreed to treat his bilateral knee osteoarthritis with prescription strength naproxen twice daily as well as formal physical therapy for knee strengthening.  We did discuss extensively alternate options for his knee pain including corticosteroid injections, viscosupplementation, PRP, and joint replacement.  He would be interested in PRP plus viscosupplementation in the future if conservative management fails. Follow-up in 6 weeks to discuss possibility of injection therapy.      Physical Examination:     The Right knee is without obvious signs of acute bony deformity, swelling, erythema, ecchymosis or joint effusion. The patella is without tenderness. Apprehension is negative with medial and lateral glide. Patella crepitus is positive. Patella grind is negative. The medial joint line is nontender and without bony crepitus or step-off. The lateral joint line is nontender and without bony crepitus or step-off. Flexion & extension are full and symmetrical. Varus & valgus stress test at 0° and 30° of flexion, Lachman's, and Willy's are all negative.    The Left knee is without obvious signs of acute bony deformity, swelling, erythema, ecchymosis. Moderate joint effusion. The patella is without tenderness. Apprehension is negative with medial and lateral glide. Patella crepitus is positive. Patella grind is negative. The medial joint line is nontender and without bony crepitus or step-off. The lateral joint line is tender and without bony crepitus or step-off. Flexion & extension are full and symmetrical. Varus & valgus stress test at 0° and 30° of flexion, Lachman's, and Willy's are all  negative. Gait is pain-free and tandem.    Imaging:  Radiographs of the bilateral knees were reviewed and revealed severe medial joint space narrowing and moderate patellofemoral joint space narrowing bilaterally.  The studies were reviewed by Dr. Pacheco in the office today.    === 05/22/24 ===  XR KNEE 1-2 VIEWS BILATERAL  - Impression -  Mild to moderate right knee arthrosis, most pronounced medially.  Mild left knee arthrosis.  Small suprapatellar left knee joint effusion.  Signed by: Omar Geiger 5/23/2024 5:54 PM        *** has no other complaints.    Physical Examination:     ***    Imaging:  Radiographs of the *** were reviewed and revealed ***.  The studies were reviewed by me personally in the office today.    === 05/22/24 ===    XR KNEE 1-2 VIEWS BILATERAL    - Impression -  Mild to moderate right knee arthrosis, most pronounced medially.    Mild left knee arthrosis.    Small suprapatellar left knee joint effusion.    MACRO:  None    Signed by: Omar Geiger 5/23/2024 5:54 PM  Dictation workstation:   [id number]      Procedure:  After consent was obtained, *** was prepped in a sterile fashion. Ultrasound guidance was used to help insure proper needle placement into the ***, decrease patient discomfort, and decrease collateral damage. The joint was visualized and Depo-Medrol 80 mg with lidocaine 4 mL & ropivacaine 4 mL were injected without any complications. Ultrasound images were saved on an internal file for later reference. The patient tolerated the procedure well and the area was cleaned and bandaged.    Problem List Items Addressed This Visit    None  Visit Diagnoses         Codes    Primary osteoarthritis of both knees    -  Primary M17.0            Assessment and Plan:     We reviewed the exam and x-ray findings and discussed the conservative and surgical treatment options. We agreed ***    **This note was dictated using Dragon speech recognition software and was not corrected for spelling or  grammatical errors**.    Leeroy Pacheco MD  Sports Medicine Specialist  [facility] [place]

## 2024-08-20 ENCOUNTER — OFFICE VISIT (OUTPATIENT)
Dept: DERMATOLOGY | Facility: CLINIC | Age: 64
End: 2024-08-20
Payer: COMMERCIAL

## 2024-08-20 DIAGNOSIS — L90.5 SCAR CONDITIONS AND FIBROSIS OF SKIN: ICD-10-CM

## 2024-08-20 DIAGNOSIS — Z85.820 HISTORY OF MELANOMA: ICD-10-CM

## 2024-08-20 DIAGNOSIS — L82.1 SEBORRHEIC KERATOSES: Primary | ICD-10-CM

## 2024-08-20 PROCEDURE — 99213 OFFICE O/P EST LOW 20 MIN: CPT | Performed by: DERMATOLOGY

## 2024-08-20 ASSESSMENT — DERMATOLOGY PATIENT ASSESSMENT
WHERE ARE THESE NEW OR CHANGING LESIONS LOCATED: RIGHT SHIN
DO YOU HAVE ANY NEW OR CHANGING LESIONS: YES
HAVE YOU HAD OR DO YOU HAVE A STAPH INFECTION: NO
DO YOU USE A TANNING BED: NO
DO YOU USE SUNSCREEN: OCCASIONALLY
HAVE YOU HAD OR DO YOU HAVE VASCULAR DISEASE: NO
ARE YOU AN ORGAN TRANSPLANT RECIPIENT: NO

## 2024-08-20 ASSESSMENT — PATIENT GLOBAL ASSESSMENT (PGA): PATIENT GLOBAL ASSESSMENT: PATIENT GLOBAL ASSESSMENT:  1 - CLEAR

## 2024-08-20 ASSESSMENT — DERMATOLOGY QUALITY OF LIFE (QOL) ASSESSMENT
ARE THERE EXCLUSIONS OR EXCEPTIONS FOR THE QUALITY OF LIFE ASSESSMENT: NO
RATE HOW BOTHERED YOU ARE BY EFFECTS OF YOUR SKIN PROBLEMS ON YOUR ACTIVITIES (EG, GOING OUT, ACCOMPLISHING WHAT YOU WANT, WORK ACTIVITIES OR YOUR RELATIONSHIPS WITH OTHERS): 0 - NEVER BOTHERED
RATE HOW BOTHERED YOU ARE BY SYMPTOMS OF YOUR SKIN PROBLEM (EG, ITCHING, STINGING BURNING, HURTING OR SKIN IRRITATION): 0 - NEVER BOTHERED
WHAT SINGLE SKIN CONDITION LISTED BELOW IS THE PATIENT ANSWERING THE QUALITY-OF-LIFE ASSESSMENT QUESTIONS ABOUT: NONE OF THE ABOVE
RATE HOW EMOTIONALLY BOTHERED YOU ARE BY YOUR SKIN PROBLEM (FOR EXAMPLE, WORRY, EMBARRASSMENT, FRUSTRATION): 0 - NEVER BOTHERED

## 2024-08-20 ASSESSMENT — ITCH NUMERIC RATING SCALE: HOW SEVERE IS YOUR ITCHING?: 0

## 2024-08-20 NOTE — PROGRESS NOTES
Subjective     Van Hayes is a 63 y.o. male who presents for the following: Suspicious Skin Lesion (Right shin of leg.  Noticed it there a few weeks ago.  Raised per patient.  Denies bleeding or itching. ).     Skin Cancer History  No skin cancer on file.    Review of Systems:  No other skin or systemic complaints other than what is documented elsewhere in the note.    The following portions of the chart were reviewed this encounter and updated as appropriate:       Specialty Problems          Dermatology Problems    Dermatitis, unspecified    Generalized skin eruption due to drugs and medicaments taken internally    Melanocytic nevi of left lower limb, including hip    Melanocytic nevi of trunk    Other melanin hyperpigmentation    Rash and other nonspecific skin eruption    Tinea pedis    Malignant melanoma of left lower leg (Multi)    Nevus of left thigh     Past Medical History:  Van Hayes  has a past medical history of Hyperlipidemia, Hypertension, Melanoma (Multi), Personal history of other endocrine, nutritional and metabolic disease (06/01/2021), and Pre-diabetes.    Past Surgical History:  Van Hayes  has a past surgical history that includes Colonoscopy; Other surgical history; Appendectomy; and Cataract extraction, bilateral (Bilateral).    Family History:  Patient family history includes Abdominal aortic artery aneurysm (__cm) in his father; Bile duct cancer in his father; Colon cancer in his father; Coronary artery disease in his father; Heart attack in his father; Prostate cancer in his father; S/P CABG in his father.    Social History:  Van Hayes  reports that he has never smoked. He has never used smokeless tobacco. He reports current alcohol use. He reports that he does not use drugs.    Allergies:  Pollen extracts    Current Medications / CAM's:    Current Outpatient Medications:     cholecalciferol (Vitamin D-3) 25 MCG (1000 UT) tablet, Take 2 tablets (50 mcg) by mouth  once daily. At Night, Disp: 90 tablet, Rfl: 2    cyanocobalamin (Vitamin B-12) 1,000 mcg tablet, Take 1 tablet (1,000 mcg) by mouth once daily. At night, Disp: , Rfl:     irbesartan (Avapro) 150 mg tablet, Take 1 tablet (150 mg) by mouth once daily. At Bedtime, Disp: 90 tablet, Rfl: 3    simvastatin (Zocor) 20 mg tablet, Take 1 tablet (20 mg) by mouth once daily at bedtime., Disp: 90 tablet, Rfl: 2    nabumetone (Relafen) 750 mg tablet, Take 1 tablet (750 mg) by mouth 2 times a day., Disp: , Rfl:      Objective   Well appearing patient in no apparent distress; mood and affect are within normal limits.    A focused spot check per patient preference:     Right Lower Leg - Anterior  - Scattered waxy tan/grey/brown papules with horn cysts    Left Popliteal Fossa  - Well healed scar at prior treatment sites without visual or palpable evidence of recurrence.          Assessment/Plan   Seborrheic keratoses  Right Lower Leg - Anterior    Seborrheic keratosis (-es)  - Discussed benign nature and that no treatment is necessary unless it becomes painful or increases in size. Patient opts for clinical monitoring at this time.      Scar conditions and fibrosis of skin  Left Popliteal Fossa    - Well healed scar(s) at sites of pt1a (0.5mm) melanoma 2021 s/p excision with Dr. Mitchell; declined SLNB (considered due to concern for angioinvasion)  - Sun protective behavior reviewed and encouraged including the use of over-the-counter sunscreen with SPF30+ daily (reapply every 1.5 hours when outdoors), UPF clothing, broad rimmed hats, sunglasses, and avoidance of midday sun. Home skin monitoring encouraged and how to monitor for skin cancer (changing or new moles, new rapidly growing or non-healing lesions) reviewed. Patient encouraged to call with interval concerns or changes.     Due for FBSE, previously following with Dr. Addison and paulie 11/2024, he will reschedule with new derm myself or one of my partners.       History of  melanoma    Related Procedures  Follow Up In Dermatology - Established Patient       Fuv next available this fall 2024 for melanoma skin check    Nataliia Sargent MD

## 2024-10-02 ENCOUNTER — OFFICE VISIT (OUTPATIENT)
Dept: ORTHOPEDIC SURGERY | Facility: HOSPITAL | Age: 64
End: 2024-10-02
Payer: COMMERCIAL

## 2024-10-02 DIAGNOSIS — S46.011D TRAUMATIC COMPLETE TEAR OF RIGHT ROTATOR CUFF, SUBSEQUENT ENCOUNTER: Primary | ICD-10-CM

## 2024-10-02 PROCEDURE — 99212 OFFICE O/P EST SF 10 MIN: CPT | Performed by: ORTHOPAEDIC SURGERY

## 2024-10-02 NOTE — PROGRESS NOTES
Status post rotator cuff repair right shoulder still has some popping and pain.  He is able to do most activities and it is not disruptive of his sleep    Right shoulder elevation 160 external rotation and abduction 90 degrees motor power in abduction 5/5 with no pain.    Right shoulder rotator cuff tear     Considering the persistence of pain I recommended close follow-up.  I would like the patient to gradually increase his activity including golfing and call me in a month.  If he has persistent pain I would recommend a repeat MRI.    This was dictated using voice recognition software and not corrected for grammatical or spelling errors.

## 2024-10-21 ENCOUNTER — APPOINTMENT (OUTPATIENT)
Dept: OPHTHALMOLOGY | Facility: CLINIC | Age: 64
End: 2024-10-21
Payer: COMMERCIAL

## 2024-11-06 NOTE — PROGRESS NOTES
Subjective   Patient ID: Van Hayes is a 63 y.o. male who presents for CPE    HPI   The patient reports a history of intermittent interruption of urine stream x 6 months.  Over the past year, he reports continued chronic frequent urination, chronic mildly weak stream, chronic urinary hesitancy, chronic slow urine stream-all unchanged.  He reports no other associated symptoms.    The patient reports a history of near constant dull aching pain in the left occipital region x 3 weeks.  He reports no precipitating, exacerbating, relieving factors.  He reports no radiation of discomfort.  He reports no other associated symptoms.  Over the past year, the patient reports no episodes of soreness over the posterior surface of the neck.    Since the patient's shoulder surgery in April 2024, he reports continued chronic intermittent episodes of sharp pain in the right shoulder region.  He reports that the episodes have been precipitated by certain movements.  He reports no radiation of discomfort and no other associated symptoms.  Over the past several months, he reports a significant decrease in the frequency and intensity of the episodes.    Over the past 6-7 months, the patient reports continued chronic intermittent episodes of dull aching pain in the left knee.  He again reports that the episodes can be precipitated by walking up stairs greater than downstairs.  He reports no associated instability in the knee.  He reports no associated symptoms and over the past 6 months, reports no change in the frequency or intensity of the episodes.  Over the past year, he reports continued chronic intermittent episodes of dull aching pain in the right knee.  He again reports that the episodes have been precipitate by walking up stairs greater than downstairs.  He does report associated intermittent episodes of instability in the knee.  He reports no associated symptoms and over the past year reports no change in the frequency or  intensity of the episodes.    Over the past year, he reports no episodes of aching tightness located just above the superior end of the midportion of the left buttock.  No other new complaints.  Review of Systems  All systems have been reviewed and are normal except as previously noted  Objective   There were no vitals taken for this visit.    Physical Exam  Head - palpation revealed no tenderness over the maxillary or frontal sinuses  Eyes - extraocular movements intact pupils equal and reactive to light; right fundus not well-visualized, left fundus revealed good retinal color, no hemorrhages or exudates  Ears - palpation of pinnas and traguses revealed no tenderness. External auditory canals not erythematous or swollen. TMs clear.   Nose - turbinates not erythematous or swollen, nasal septal deviation noted to the right  Mouth - posterior pharynx is erythematous but not swollen Tonsillar pillars appeared normal no exudates  Neck - no lymphadenopathy. Thyroid gland not enlarged. , No bruits  Lungs - clear to auscultation bilaterally  Cardiac - rate normal rhythm regular no murmurs no JVD  Abdomen - soft nondistended normal active bowel sounds. Palpation revealed no tenderness or masses  Rectal-deferred.  Pulses 2+ bilaterally   Extremities-no peripheral edema  Musculoskeletal  Cervical spine-no erythema or swelling.  Full range of motion in all directions of motion with no pain.  Palpation revealed no tenderness or increase in warm  Right shoulder-no erythema or swelling.  Full range of motion with mild discomfort noted on abduction 90 degrees.  Full range of motion with no pain noted in all other directions of motion.  Positive Ivan sign, negative arm crossover test, negative impingement sign, positive Neer's sign, negative empty can test, negative Yergason's sign  Right knee -no erythema or swelling noted.. Full range of motion in all directions of motion with no pain. Palpation revealed crepitus but no  tenderness or increase in warmth.  Negative Lachmans test, negative Willy test-;, negative patellar apprehension test. There is no pain or laxity of the collateral ligaments.  Left knee-no erythema or swelling.  Full range of motion in all directions of motion no pain.  Palpation revealed no tenderness, increase in warmth, or crepitus.  Negative Lachemann's test, negative Willy test, negative patellar apprehension test; no pain or laxity of the collateral ligaments noted  Skin-moderate scaling noted over the lateral, medial, and plantar surfaces of both feet. No erythema or eruptions noted. Palpation revealed no tenderness or increase in warmth.  Yellowish-greenish discoloration noted in all toenails.     Neurologic  Mental status-alert and oriented x3   Cranial nerves-2 through 12 grossly intact, no visual field abnormalities  Motor-no pronator drift noted, strength-5/5 in all muscle groups tested, , no tremor noted.  No bradykinesia noted.  No rigidity noted.  Negative pull test  Sensory-Light touch sensation fully intact  Pinprick sensation fully intact  Vibratory sensation diminished in the first toes bilaterally equally  Cerebellar-no truncal ataxia, good coordination finger-nose testing,, good coordination heel-to-shin testing, normal rapid alternating movements  Romberg negative, no abnormality in tandem gait  Reflexes-1+/4 bilaterally  Assessment/Plan   Problem List Items Addressed This Visit             ICD-10-CM    Benign essential HTN - Primary I10    Relevant Orders    CBC and Auto Differential    Comprehensive Metabolic Panel    C-Reactive Protein, High Sensitivity    Hemoglobin A1C    HIV 1/2 Antigen/Antibody Screen with Reflex to Confirmation    Lipid Panel    Prostate Specific Antigen    Thyroid Stimulating Hormone    Vitamin B12    Vitamin D 25-Hydroxy,Total (for eval of Vitamin D levels)    Elevated LDL cholesterol level E78.00    Relevant Orders    CBC and Auto Differential    Comprehensive  Metabolic Panel    C-Reactive Protein, High Sensitivity    Hemoglobin A1C    HIV 1/2 Antigen/Antibody Screen with Reflex to Confirmation    Lipid Panel    Prostate Specific Antigen    Thyroid Stimulating Hormone    Vitamin B12    Vitamin D 25-Hydroxy,Total (for eval of Vitamin D levels)    Inability to attain erection N52.9    Relevant Orders    CBC and Auto Differential    Comprehensive Metabolic Panel    C-Reactive Protein, High Sensitivity    Hemoglobin A1C    HIV 1/2 Antigen/Antibody Screen with Reflex to Confirmation    Lipid Panel    Prostate Specific Antigen    Thyroid Stimulating Hormone    Vitamin B12    Vitamin D 25-Hydroxy,Total (for eval of Vitamin D levels)    Right knee DJD M17.11    Relevant Orders    CBC and Auto Differential    Comprehensive Metabolic Panel    C-Reactive Protein, High Sensitivity    Hemoglobin A1C    HIV 1/2 Antigen/Antibody Screen with Reflex to Confirmation    Lipid Panel    Prostate Specific Antigen    Thyroid Stimulating Hormone    Vitamin B12    Vitamin D 25-Hydroxy,Total (for eval of Vitamin D levels)    Vitamin B12 deficiency E53.8    Relevant Orders    CBC and Auto Differential    Comprehensive Metabolic Panel    C-Reactive Protein, High Sensitivity    Hemoglobin A1C    HIV 1/2 Antigen/Antibody Screen with Reflex to Confirmation    Lipid Panel    Prostate Specific Antigen    Thyroid Stimulating Hormone    Vitamin B12    Vitamin D 25-Hydroxy,Total (for eval of Vitamin D levels)    Prediabetes R73.03    Relevant Orders    CBC and Auto Differential    Comprehensive Metabolic Panel    C-Reactive Protein, High Sensitivity    Hemoglobin A1C    HIV 1/2 Antigen/Antibody Screen with Reflex to Confirmation    Lipid Panel    Prostate Specific Antigen    Thyroid Stimulating Hormone    Vitamin B12    Vitamin D 25-Hydroxy,Total (for eval of Vitamin D levels)        Assessment      Noncardiac chest pain  Hypertension -blood pressure at goal  Chronic intermittent presence of floaters in  both fields of vision-probably secondary to posterior vitreous detachment.  Cataract right eye status post removal September 7, 2023 ;cataract left eye status post removal November 2, 2023  Allergic rhinitis  Gastroesophageal reflux  Colonic polyp  Appendicitis-status post laparoscopic appendectomy February 22, 2024  Intermittent episodes of an interruption in urine stream-likely secondary to BPH  Chronic frequent urination, chronic mildly weak stream-, urinary hesitancy, slow urine stream-? Secondary to BPH  Erectile dysfunction  Prediabetes  Hypertriglyceridemia  Vitamin B12 deficiency  Vitamin D deficiency-mild continued intermittent episodes of sharp pain right shoulder region-May be secondary to osteoarthritis, rotator cuff tendinopathy  Large full-thickness tear of the supraspinatus and partial infraspinatus, tendinopathy right shoulder status post rotator cuff repair April 11, 2024  Chronic intermittent episodes of dull/aching pain in right knee and the left knee maybe secondary to  osteoarthritis  Osteoarthritis of the right knee  Osteoarthritis of the left knee near constant dull aching pain in the left occipital region-May be secondary to osteoarthritis and degenerative disc disease of the cervical spine, muscle tension headache, obstructive sleep apnea  Mild osteoarthritis and degenerative disc disease of the cervical spine at C5-C7    Chronic generalized fatigue-unsure of etiology.  May be secondary to obstructive sleep apnea?  Chronic intermittent episodes of generalized pruritus-unsure of etiology.  Melanoma left posterior lower leg status post excision September 24, 2021  Chronic presence of scaling noted over the lateral, medial and plantar surfaces of both feet-likely secondary tinea pedis  Onychomycosis of multiple toenails     Plan  Obtain CBC differential, CMP, fasting lipid profile, TSH, vitamin D level, vitamin B12 level, cardiac CRP is soon as possible  Obtain  urinalysis today.  Arrange for  MRI/MRI of the brain is soon as possible.  I did encourage the patient to use Relafen 750 mg p.o. twice daily as needed pain, I also recommended that the patient apply Voltaren gel to the knees and right shoulder region every 6 hours as needed.  The patient will return for a complete physical examination in 1 year

## 2024-11-07 ENCOUNTER — APPOINTMENT (OUTPATIENT)
Dept: PRIMARY CARE | Facility: CLINIC | Age: 64
End: 2024-11-07
Payer: COMMERCIAL

## 2024-11-07 VITALS
DIASTOLIC BLOOD PRESSURE: 80 MMHG | WEIGHT: 243.6 LBS | BODY MASS INDEX: 33.04 KG/M2 | SYSTOLIC BLOOD PRESSURE: 118 MMHG | HEART RATE: 96 BPM

## 2024-11-07 DIAGNOSIS — R73.03 PREDIABETES: ICD-10-CM

## 2024-11-07 DIAGNOSIS — Z00.00 ROUTINE GENERAL MEDICAL EXAMINATION AT A HEALTH CARE FACILITY: ICD-10-CM

## 2024-11-07 DIAGNOSIS — E53.8 VITAMIN B12 DEFICIENCY: ICD-10-CM

## 2024-11-07 DIAGNOSIS — M17.11 PRIMARY OSTEOARTHRITIS OF RIGHT KNEE: ICD-10-CM

## 2024-11-07 DIAGNOSIS — E78.00 ELEVATED LDL CHOLESTEROL LEVEL: ICD-10-CM

## 2024-11-07 DIAGNOSIS — I10 BENIGN ESSENTIAL HTN: Primary | ICD-10-CM

## 2024-11-07 DIAGNOSIS — N52.9 INABILITY TO ATTAIN ERECTION: ICD-10-CM

## 2024-11-07 LAB
POC APPEARANCE, URINE: CLEAR
POC BILIRUBIN, URINE: ABNORMAL
POC BLOOD, URINE: NEGATIVE
POC COLOR, URINE: YELLOW
POC GLUCOSE, URINE: NEGATIVE MG/DL
POC KETONES, URINE: NEGATIVE MG/DL
POC LEUKOCYTES, URINE: NEGATIVE
POC NITRITE,URINE: NEGATIVE
POC PH, URINE: 5 PH
POC PROTEIN, URINE: NEGATIVE MG/DL
POC SPECIFIC GRAVITY, URINE: >=1.03
POC UROBILINOGEN, URINE: 0.2 EU/DL

## 2024-11-07 PROCEDURE — 99396 PREV VISIT EST AGE 40-64: CPT | Performed by: INTERNAL MEDICINE

## 2024-11-07 PROCEDURE — 99214 OFFICE O/P EST MOD 30 MIN: CPT | Performed by: INTERNAL MEDICINE

## 2024-11-07 PROCEDURE — 81002 URINALYSIS NONAUTO W/O SCOPE: CPT | Performed by: INTERNAL MEDICINE

## 2024-11-07 PROCEDURE — 3079F DIAST BP 80-89 MM HG: CPT | Performed by: INTERNAL MEDICINE

## 2024-11-07 PROCEDURE — 3074F SYST BP LT 130 MM HG: CPT | Performed by: INTERNAL MEDICINE

## 2024-11-07 NOTE — ADDENDUM NOTE
Addended by: LISSETH MURPHY on: 11/7/2024 05:00 PM     Modules accepted: Orders     [facility] calling and pt needed to have BMP/creatinine drawn prior to CT scan with contrast and lab is stating order is for 8/4/18 and cannot draw it. Order changed to today as pt is waiting and needs CT scan. Requested to order stat as patient already waiting 40 min.

## 2024-11-11 ENCOUNTER — APPOINTMENT (OUTPATIENT)
Dept: DERMATOLOGY | Facility: CLINIC | Age: 64
End: 2024-11-11
Payer: COMMERCIAL

## 2024-11-13 ENCOUNTER — TELEPHONE (OUTPATIENT)
Dept: DERMATOLOGY | Facility: CLINIC | Age: 64
End: 2024-11-13
Payer: COMMERCIAL

## 2024-11-13 NOTE — TELEPHONE ENCOUNTER
Left message for pt informing of appointment on 12/09 will be cancelled as Dr. Sargent will not be in the office for the remainder of this month and part of next month.  Offered an appt with Dr. Gates  for this Friday at the [facility]. Asked that he call the [place] location directly to schedule.

## 2024-11-15 ENCOUNTER — OFFICE VISIT (OUTPATIENT)
Dept: DERMATOLOGY | Facility: CLINIC | Age: 64
End: 2024-11-15
Payer: COMMERCIAL

## 2024-11-15 DIAGNOSIS — D22.9 MULTIPLE BENIGN NEVI: ICD-10-CM

## 2024-11-15 DIAGNOSIS — L82.1 SEBORRHEIC KERATOSES: Primary | ICD-10-CM

## 2024-11-15 DIAGNOSIS — L90.5 SCAR CONDITIONS AND FIBROSIS OF SKIN: ICD-10-CM

## 2024-11-15 DIAGNOSIS — Z85.820 HISTORY OF MELANOMA: ICD-10-CM

## 2024-11-15 DIAGNOSIS — D18.01 HEMANGIOMA OF SKIN: ICD-10-CM

## 2024-11-15 PROCEDURE — 99213 OFFICE O/P EST LOW 20 MIN: CPT | Performed by: STUDENT IN AN ORGANIZED HEALTH CARE EDUCATION/TRAINING PROGRAM

## 2024-11-15 NOTE — PROGRESS NOTES
Lillie Hayes is a 63 y.o. male who presents for the following: Skin Check (FBSE, no areas of concern. HX of melanoma on left calf).     Review of Systems:  No other skin or systemic complaints other than what is documented elsewhere in the note.    The following portions of the chart were reviewed this encounter and updated as appropriate:         Skin Cancer History  No skin cancer on file.      Specialty Problems          Dermatology Problems    Dermatitis, unspecified    Generalized skin eruption due to drugs and medicaments taken internally    Melanocytic nevi of left lower limb, including hip    Melanocytic nevi of trunk    Other melanin hyperpigmentation    Rash and other nonspecific skin eruption    Tinea pedis    Malignant melanoma of left lower leg (Multi)    Nevus of left thigh        Objective   Well appearing patient in no apparent distress; mood and affect are within normal limits.    A full examination was performed including scalp, head, eyes, ears, nose, lips, neck, chest, axillae, abdomen, back, buttocks, bilateral upper extremities, bilateral lower extremities, hands, feet, fingers, toes, fingernails, and toenails. All findings within normal limits unless otherwise noted below.    Assessment/Plan   1. Seborrheic keratoses  Right Lower Leg - Anterior  - Scattered waxy tan/grey/brown papules with horn cysts    Seborrheic keratosis (-es)  - Discussed benign nature and that no treatment is necessary unless it becomes painful or increases in size. Patient opts for clinical monitoring at this time.      2. History of melanoma    Related Procedures  Follow Up In Dermatology - Established Patient    3. Scar conditions and fibrosis of skin  Left Popliteal Fossa  - Well healed scar at prior treatment sites without visual or palpable evidence of recurrence.     - Well healed scar(s) at sites of pt1a (0.5mm) melanoma 2021 s/p excision with Dr. Mitchell; declined SLNB (considered due to  concern for angioinvasion)  - Sun protective behavior reviewed and encouraged including the use of over-the-counter sunscreen with SPF30+ daily (reapply every 1.5 hours when outdoors), UPF clothing, broad rimmed hats, sunglasses, and avoidance of midday sun. Home skin monitoring encouraged and how to monitor for skin cancer (changing or new moles, new rapidly growing or non-healing lesions) reviewed. Patient encouraged to call with interval concerns or changes.   - Advised to have his kids get a skin check for screening      4. Multiple benign nevi  Brown macules with reassuring pigment pattern on dermoscopy    Benign, reassurance provided    5. Hemangioma of skin  Scattered cherry-red papule(s).    Benign, reassurance provided      RTC 1 year    Patient seen and discussed with Dr. Gates,   Juana Montoya MD MPH  PGY-2, Department of Dermatology    I was present during all key portions of visit including history, exam, discussion/plan and/or procedures and directly supervised our resident during all portions of the visit, follow up care, medications and more    Israel Gates MD

## 2024-12-09 ENCOUNTER — APPOINTMENT (OUTPATIENT)
Dept: DERMATOLOGY | Facility: CLINIC | Age: 64
End: 2024-12-09
Payer: COMMERCIAL

## 2025-02-17 DIAGNOSIS — E78.00 ELEVATED LDL CHOLESTEROL LEVEL: ICD-10-CM

## 2025-02-17 RX ORDER — SIMVASTATIN 20 MG/1
20 TABLET, FILM COATED ORAL NIGHTLY
Qty: 90 TABLET | Refills: 3 | Status: SHIPPED | OUTPATIENT
Start: 2025-02-17

## 2025-02-18 DIAGNOSIS — Z00.00 HEALTHCARE MAINTENANCE: ICD-10-CM

## 2025-02-18 RX ORDER — IRBESARTAN 150 MG/1
TABLET ORAL
Qty: 90 TABLET | Refills: 3 | Status: SHIPPED | OUTPATIENT
Start: 2025-02-18

## 2025-07-23 LAB
25(OH)D3+25(OH)D2 SERPL-MCNC: 41 NG/ML (ref 30–100)
ALBUMIN SERPL-MCNC: 4.4 G/DL (ref 3.6–5.1)
ALP SERPL-CCNC: 83 U/L (ref 35–144)
ALT SERPL-CCNC: 21 U/L (ref 9–46)
ANION GAP SERPL CALCULATED.4IONS-SCNC: 8 MMOL/L (CALC) (ref 7–17)
AST SERPL-CCNC: 20 U/L (ref 10–35)
BASOPHILS # BLD AUTO: 48 CELLS/UL (ref 0–200)
BASOPHILS NFR BLD AUTO: 0.9 %
BILIRUB SERPL-MCNC: 0.5 MG/DL (ref 0.2–1.2)
BUN SERPL-MCNC: 13 MG/DL (ref 7–25)
CALCIUM SERPL-MCNC: 9.2 MG/DL (ref 8.6–10.3)
CHLORIDE SERPL-SCNC: 105 MMOL/L (ref 98–110)
CHOLEST SERPL-MCNC: 158 MG/DL
CHOLEST/HDLC SERPL: 3.2 (CALC)
CO2 SERPL-SCNC: 27 MMOL/L (ref 20–32)
CREAT SERPL-MCNC: 0.82 MG/DL (ref 0.7–1.35)
CRP SERPL HS-MCNC: 0.4 MG/L
EGFRCR SERPLBLD CKD-EPI 2021: 98 ML/MIN/1.73M2
EOSINOPHIL # BLD AUTO: 488 CELLS/UL (ref 15–500)
EOSINOPHIL NFR BLD AUTO: 9.2 %
ERYTHROCYTE [DISTWIDTH] IN BLOOD BY AUTOMATED COUNT: 13 % (ref 11–15)
EST. AVERAGE GLUCOSE BLD GHB EST-MCNC: 111 MG/DL
EST. AVERAGE GLUCOSE BLD GHB EST-SCNC: 6.2 MMOL/L
GLUCOSE SERPL-MCNC: 104 MG/DL (ref 65–99)
HBA1C MFR BLD: 5.5 %
HCT VFR BLD AUTO: 48.5 % (ref 38.5–50)
HDLC SERPL-MCNC: 49 MG/DL
HGB BLD-MCNC: 16.2 G/DL (ref 13.2–17.1)
HIV 1+2 AB+HIV1 P24 AG SERPL QL IA: NORMAL
HIV 1+2 AB+HIV1 P24 AG SERPL QL IA: NORMAL
LDLC SERPL CALC-MCNC: 83 MG/DL (CALC)
LYMPHOCYTES # BLD AUTO: 1521 CELLS/UL (ref 850–3900)
LYMPHOCYTES NFR BLD AUTO: 28.7 %
MCH RBC QN AUTO: 30.7 PG (ref 27–33)
MCHC RBC AUTO-ENTMCNC: 33.4 G/DL (ref 32–36)
MCV RBC AUTO: 92 FL (ref 80–100)
MONOCYTES # BLD AUTO: 350 CELLS/UL (ref 200–950)
MONOCYTES NFR BLD AUTO: 6.6 %
NEUTROPHILS # BLD AUTO: 2894 CELLS/UL (ref 1500–7800)
NEUTROPHILS NFR BLD AUTO: 54.6 %
NONHDLC SERPL-MCNC: 109 MG/DL (CALC)
PLATELET # BLD AUTO: 200 THOUSAND/UL (ref 140–400)
PMV BLD REES-ECKER: 10.6 FL (ref 7.5–12.5)
POTASSIUM SERPL-SCNC: 4.4 MMOL/L (ref 3.5–5.3)
PROT SERPL-MCNC: 6.6 G/DL (ref 6.1–8.1)
PSA SERPL-MCNC: 1.16 NG/ML
RBC # BLD AUTO: 5.27 MILLION/UL (ref 4.2–5.8)
SODIUM SERPL-SCNC: 140 MMOL/L (ref 135–146)
TRIGL SERPL-MCNC: 154 MG/DL
TSH SERPL-ACNC: 1.47 MIU/L (ref 0.4–4.5)
VIT B12 SERPL-MCNC: 440 PG/ML (ref 200–1100)
WBC # BLD AUTO: 5.3 THOUSAND/UL (ref 3.8–10.8)

## 2025-11-19 ENCOUNTER — APPOINTMENT (OUTPATIENT)
Dept: PRIMARY CARE | Facility: CLINIC | Age: 65
End: 2025-11-19
Payer: COMMERCIAL

## 2025-12-22 ENCOUNTER — APPOINTMENT (OUTPATIENT)
Dept: DERMATOLOGY | Facility: CLINIC | Age: 65
End: 2025-12-22
Payer: COMMERCIAL

## 2026-07-29 ENCOUNTER — APPOINTMENT (OUTPATIENT)
Dept: PRIMARY CARE | Facility: CLINIC | Age: 66
End: 2026-07-29
Payer: COMMERCIAL

## (undated) DEVICE — Device

## (undated) DEVICE — RETRIEVAL SYSTEM, MONARCH, 10MM DISP ENDOSCOPIC

## (undated) DEVICE — TROCAR, OPTICAL, BLADELESS, KII FIOS, 5 X 100MM, THREADED

## (undated) DEVICE — TUBE SET, PNEUMOCLEAR, SMOKE EVACU, HIGH-FLOW

## (undated) DEVICE — DRESSING, GAUZE, FLUFF, 1 PLY, 18 X 36 IN

## (undated) DEVICE — TROCAR SYSTEM, BALLOON, KII GELPORT, 12 X 100MM

## (undated) DEVICE — DRAPE, SHEET, UTILITY, NON ABSORBENT, 18 X 26 IN, LF

## (undated) DEVICE — NEEDLE, HYPODERMIC, MONOJECT, 25 G X 1.5 IN, LUER LOCK HUB, RED

## (undated) DEVICE — DRAPE, INSTRUMENT, W/POUCH, STERI DRAPE, 7 X 11 IN, DISPOSABLE, STERILE

## (undated) DEVICE — SOLUTION, INJECTION, SODIUM CHLORIDE 9%, 3000ML

## (undated) DEVICE — DRESSING, ABDOMINAL PAD, CURITY, 8 X 10 IN

## (undated) DEVICE — DRESSING, GAUZE, PETROLATUM, PATCH, XEROFORM, 1 X 8 IN, STERILE

## (undated) DEVICE — HEMOSTAT, ARISTA, ABSORBABLE, 3 GRAMS

## (undated) DEVICE — GLOVE, SURGICAL, PROTEXIS PI , 7.5, PF, LF

## (undated) DEVICE — CLIP, ENDO APPLIER LIGAMAX 5MM

## (undated) DEVICE — SOLUTION, OPHTHALMIC, TETRACAINE HCL 0.5%, 2 ML, VIAL

## (undated) DEVICE — PUMP, STRYKERFLOW 2 & HANDPIECE W/10FT. IRRIGATION TUBING

## (undated) DEVICE — CANNULA, KII ADVANCED FIXATION, 5X100MM W/SEAL

## (undated) DEVICE — GLOVE, SURGICAL, PROTEXIS PI ORTHO, 8.0, PF, LF

## (undated) DEVICE — SUTURE, VICRYL, 0, 27 IN, UR-6, VIOLET

## (undated) DEVICE — BLANKET, LOWER BODY, VHA PLUS, ADULT

## (undated) DEVICE — DRESSING, GAUZE, PETROLATUM, XEROFORM, 4 X 4, PEELABLE FOIL

## (undated) DEVICE — 22CM X 45CM, LARGE DELUXE ARM SLING W/PAD

## (undated) DEVICE — STRIP, SKIN CLOSURE, STERI STRIP, REINFORCED, 0.5 X 4 IN

## (undated) DEVICE — SUTURE, MONOCRYL, 4-0, 18 IN, PS2, UNDYED

## (undated) DEVICE — TROCAR, OPTICAL BLADELESS 5MM X 100 W/ADVANCED FIXATION

## (undated) DEVICE — ELECTRODE, ELECTROSURGICAL, BLADE, INSULATED, ENT/IMA, STERILE

## (undated) DEVICE — PEN, SKIN MARKER FOR TREPHINES & PUNCHES

## (undated) DEVICE — KIT, STABILIZATION SHOULDER

## (undated) DEVICE — TUBING, SUCTION, 6MM X 10, CLEAN N-COND

## (undated) DEVICE — CANNULA, THR 6.5 X 73MM CLEAR FLEXIBLE

## (undated) DEVICE — APPLICATOR, COTTON TIP, 6 IN, 2PK, STERILE

## (undated) DEVICE — RETRIEVER, ENDOPOUCH, SPEC BAG

## (undated) DEVICE — STAPLER, EF POWERED PLUS, CUTTER 340MM, 45MM EFFECTOR, DISP

## (undated) DEVICE — TUBING, ARTHROSCOPIC INFLOW, 10K

## (undated) DEVICE — LIGASURE, V SEALER/DIVIDER  5MM BLUNT TIP

## (undated) DEVICE — GLOVE, SURGICAL, PROTEXIS PI BLUE W/NEUTHERA, 8.0, PF, LF

## (undated) DEVICE — COVER, BACK TABLE, STERILE-Z, XLG

## (undated) DEVICE — SYRINGE, 10 CC, LUER LOCK

## (undated) DEVICE — NEEDLE, SPECTRUM AUTOPASS, DISP

## (undated) DEVICE — SYRINGE, 20 CC, LUER LOCK

## (undated) DEVICE — GLOVE, SURGICAL, PROTEXIS PI , 8.0, PF, LF

## (undated) DEVICE — PROBE, APOLLO RF, 90 DEG, EXTRA LARTGE

## (undated) DEVICE — CAUTERY, PENCIL, PUSH BUTTON, SMOKE EVAC, 70MM